# Patient Record
Sex: FEMALE | Race: WHITE | NOT HISPANIC OR LATINO | Employment: UNEMPLOYED | URBAN - METROPOLITAN AREA
[De-identification: names, ages, dates, MRNs, and addresses within clinical notes are randomized per-mention and may not be internally consistent; named-entity substitution may affect disease eponyms.]

---

## 2023-12-14 ENCOUNTER — HOSPITAL ENCOUNTER (EMERGENCY)
Facility: HOSPITAL | Age: 53
Discharge: HOME | End: 2023-12-14
Payer: COMMERCIAL

## 2023-12-14 VITALS
HEART RATE: 82 BPM | TEMPERATURE: 98.2 F | BODY MASS INDEX: 20.16 KG/M2 | SYSTOLIC BLOOD PRESSURE: 115 MMHG | WEIGHT: 133 LBS | RESPIRATION RATE: 18 BRPM | OXYGEN SATURATION: 99 % | DIASTOLIC BLOOD PRESSURE: 78 MMHG | HEIGHT: 68 IN

## 2023-12-14 DIAGNOSIS — N30.91 HEMORRHAGIC CYSTITIS: Primary | ICD-10-CM

## 2023-12-14 LAB
ANION GAP SERPL CALC-SCNC: 11 MMOL/L (ref 10–20)
APPEARANCE UR: ABNORMAL
BACTERIA #/AREA URNS AUTO: ABNORMAL /HPF
BASOPHILS # BLD AUTO: 0.03 X10*3/UL (ref 0–0.1)
BASOPHILS NFR BLD AUTO: 0.2 %
BILIRUB UR STRIP.AUTO-MCNC: NEGATIVE MG/DL
BUN SERPL-MCNC: 19 MG/DL (ref 6–23)
CALCIUM SERPL-MCNC: 9.9 MG/DL (ref 8.6–10.3)
CHLORIDE SERPL-SCNC: 102 MMOL/L (ref 98–107)
CO2 SERPL-SCNC: 27 MMOL/L (ref 21–32)
COLOR UR: YELLOW
CREAT SERPL-MCNC: 0.8 MG/DL (ref 0.5–1.05)
EOSINOPHIL # BLD AUTO: 0.04 X10*3/UL (ref 0–0.7)
EOSINOPHIL NFR BLD AUTO: 0.3 %
ERYTHROCYTE [DISTWIDTH] IN BLOOD BY AUTOMATED COUNT: 12.3 % (ref 11.5–14.5)
GFR SERPL CREATININE-BSD FRML MDRD: 89 ML/MIN/1.73M*2
GLUCOSE SERPL-MCNC: 93 MG/DL (ref 74–99)
GLUCOSE UR STRIP.AUTO-MCNC: NEGATIVE MG/DL
HCT VFR BLD AUTO: 42.4 % (ref 36–46)
HGB BLD-MCNC: 14.3 G/DL (ref 12–16)
HOLD SPECIMEN: NORMAL
IMM GRANULOCYTES # BLD AUTO: 0.06 X10*3/UL (ref 0–0.7)
IMM GRANULOCYTES NFR BLD AUTO: 0.4 % (ref 0–0.9)
KETONES UR STRIP.AUTO-MCNC: NEGATIVE MG/DL
LEUKOCYTE ESTERASE UR QL STRIP.AUTO: ABNORMAL
LYMPHOCYTES # BLD AUTO: 3.24 X10*3/UL (ref 1.2–4.8)
LYMPHOCYTES NFR BLD AUTO: 21.3 %
MCH RBC QN AUTO: 30.5 PG (ref 26–34)
MCHC RBC AUTO-ENTMCNC: 33.7 G/DL (ref 32–36)
MCV RBC AUTO: 90 FL (ref 80–100)
MONOCYTES # BLD AUTO: 0.83 X10*3/UL (ref 0.1–1)
MONOCYTES NFR BLD AUTO: 5.4 %
MUCOUS THREADS #/AREA URNS AUTO: ABNORMAL /LPF
NEUTROPHILS # BLD AUTO: 11.03 X10*3/UL (ref 1.2–7.7)
NEUTROPHILS NFR BLD AUTO: 72.4 %
NITRITE UR QL STRIP.AUTO: NEGATIVE
NRBC BLD-RTO: 0 /100 WBCS (ref 0–0)
PH UR STRIP.AUTO: 6 [PH]
PLATELET # BLD AUTO: 324 X10*3/UL (ref 150–450)
POTASSIUM SERPL-SCNC: 4 MMOL/L (ref 3.5–5.3)
PROT UR STRIP.AUTO-MCNC: ABNORMAL MG/DL
RBC # BLD AUTO: 4.69 X10*6/UL (ref 4–5.2)
RBC # UR STRIP.AUTO: ABNORMAL /UL
RBC #/AREA URNS AUTO: >20 /HPF
SODIUM SERPL-SCNC: 136 MMOL/L (ref 136–145)
SP GR UR STRIP.AUTO: 1.02
SQUAMOUS #/AREA URNS AUTO: ABNORMAL /HPF
UROBILINOGEN UR STRIP.AUTO-MCNC: <2 MG/DL
WBC # BLD AUTO: 15.2 X10*3/UL (ref 4.4–11.3)
WBC #/AREA URNS AUTO: >50 /HPF

## 2023-12-14 PROCEDURE — 96365 THER/PROPH/DIAG IV INF INIT: CPT

## 2023-12-14 PROCEDURE — 96361 HYDRATE IV INFUSION ADD-ON: CPT

## 2023-12-14 PROCEDURE — 80048 BASIC METABOLIC PNL TOTAL CA: CPT | Performed by: PHYSICIAN ASSISTANT

## 2023-12-14 PROCEDURE — 85025 COMPLETE CBC W/AUTO DIFF WBC: CPT | Performed by: PHYSICIAN ASSISTANT

## 2023-12-14 PROCEDURE — 36415 COLL VENOUS BLD VENIPUNCTURE: CPT | Performed by: PHYSICIAN ASSISTANT

## 2023-12-14 PROCEDURE — 2500000004 HC RX 250 GENERAL PHARMACY W/ HCPCS (ALT 636 FOR OP/ED): Performed by: PHYSICIAN ASSISTANT

## 2023-12-14 PROCEDURE — 99284 EMERGENCY DEPT VISIT MOD MDM: CPT

## 2023-12-14 PROCEDURE — 81001 URINALYSIS AUTO W/SCOPE: CPT | Performed by: PHYSICIAN ASSISTANT

## 2023-12-14 RX ORDER — FLUCONAZOLE 150 MG/1
150 TABLET ORAL ONCE
Qty: 2 TABLET | Refills: 0 | Status: SHIPPED | OUTPATIENT
Start: 2023-12-14 | End: 2023-12-14

## 2023-12-14 RX ORDER — CEPHALEXIN 500 MG/1
500 TABLET ORAL 4 TIMES DAILY
Qty: 40 TABLET | Refills: 0 | Status: SHIPPED | OUTPATIENT
Start: 2023-12-14 | End: 2023-12-24

## 2023-12-14 RX ORDER — CEFTRIAXONE 1 G/50ML
1 INJECTION, SOLUTION INTRAVENOUS ONCE
Status: COMPLETED | OUTPATIENT
Start: 2023-12-14 | End: 2023-12-14

## 2023-12-14 RX ADMIN — CEFTRIAXONE SODIUM 1 G: 1 INJECTION, SOLUTION INTRAVENOUS at 19:22

## 2023-12-14 RX ADMIN — SODIUM CHLORIDE 1000 ML: 9 INJECTION, SOLUTION INTRAVENOUS at 18:14

## 2023-12-14 ASSESSMENT — PAIN - FUNCTIONAL ASSESSMENT: PAIN_FUNCTIONAL_ASSESSMENT: 0-10

## 2023-12-14 ASSESSMENT — LIFESTYLE VARIABLES
HAVE YOU EVER FELT YOU SHOULD CUT DOWN ON YOUR DRINKING: NO
HAVE PEOPLE ANNOYED YOU BY CRITICIZING YOUR DRINKING: NO
EVER FELT BAD OR GUILTY ABOUT YOUR DRINKING: NO
EVER HAD A DRINK FIRST THING IN THE MORNING TO STEADY YOUR NERVES TO GET RID OF A HANGOVER: NO
REASON UNABLE TO ASSESS: NO

## 2023-12-14 ASSESSMENT — COLUMBIA-SUICIDE SEVERITY RATING SCALE - C-SSRS
1. IN THE PAST MONTH, HAVE YOU WISHED YOU WERE DEAD OR WISHED YOU COULD GO TO SLEEP AND NOT WAKE UP?: NO
6. HAVE YOU EVER DONE ANYTHING, STARTED TO DO ANYTHING, OR PREPARED TO DO ANYTHING TO END YOUR LIFE?: NO
2. HAVE YOU ACTUALLY HAD ANY THOUGHTS OF KILLING YOURSELF?: NO

## 2023-12-14 ASSESSMENT — PAIN SCALES - GENERAL: PAINLEVEL_OUTOF10: 0 - NO PAIN

## 2023-12-14 NOTE — Clinical Note
Kasey Byrne was seen and treated in our emergency department on 12/14/2023.  She may return to work on 12/18/2023.       If you have any questions or concerns, please don't hesitate to call.      Samreen Martinez PA-C

## 2023-12-14 NOTE — ED PROVIDER NOTES
"HPI   Chief Complaint   Patient presents with    Blood in Urine     Hx of hematuria and connective tissue disorder and neurogenic bladder        53-year-old female presents to the emergency department for complaints of bloody urine.  Patient states \"I have a connective tissue disorder so I have chronic gross hematuria.\"  She states sometimes she has a urinary tract infection associated but not always.  States that she has had some pressure with urinating and seems to be bleeding more this week so she wanted to get checked out.  She has never required a blood transfusion for urinary blood loss.  She does not see a urologist or nephrologist.  She denies abdominal pain, flank pain, back pain, nausea, vomiting, fevers, flulike symptoms.  She has no history of kidney stones.                          No data recorded                Patient History   Past Medical History:   Diagnosis Date    Personal history of other diseases of the female genital tract     History of ovarian cyst    Personal history of other diseases of the musculoskeletal system and connective tissue     History of joint pain    Sleep deprivation     Sleep deficient     Past Surgical History:   Procedure Laterality Date    OTHER SURGICAL HISTORY  08/14/2020    Jaw surgery    OTHER SURGICAL HISTORY  08/14/2020    Dilation and curettage    OTHER SURGICAL HISTORY  08/14/2020    Nasal septoplasty    OTHER SURGICAL HISTORY  08/14/2020    Gallbladder surgery    OTHER SURGICAL HISTORY  08/14/2020    Varicose vein ligation     No family history on file.  Social History     Tobacco Use    Smoking status: Not on file    Smokeless tobacco: Not on file   Substance Use Topics    Alcohol use: Not on file    Drug use: Not on file       Physical Exam   ED Triage Vitals [12/14/23 1651]   Temp Heart Rate Resp BP   36.8 °C (98.2 °F) 82 18 115/78      SpO2 Temp src Heart Rate Source Patient Position   99 % -- -- --      BP Location FiO2 (%)     -- --       Physical " Exam  Vitals and nursing note reviewed.   Constitutional:       General: She is not in acute distress.  HENT:      Head: Atraumatic.      Mouth/Throat:      Mouth: Mucous membranes are moist.      Pharynx: Oropharynx is clear.   Eyes:      Extraocular Movements: Extraocular movements intact.      Conjunctiva/sclera: Conjunctivae normal.      Pupils: Pupils are equal, round, and reactive to light.   Cardiovascular:      Rate and Rhythm: Normal rate and regular rhythm.      Pulses: Normal pulses.   Pulmonary:      Effort: Pulmonary effort is normal. No respiratory distress.      Breath sounds: Normal breath sounds.   Abdominal:      General: There is no distension.      Palpations: Abdomen is soft.      Tenderness: There is no abdominal tenderness. There is no guarding or rebound.   Musculoskeletal:         General: No deformity.      Cervical back: Neck supple.   Skin:     General: Skin is warm and dry.   Neurological:      Mental Status: She is alert and oriented to person, place, and time. Mental status is at baseline.      Cranial Nerves: No cranial nerve deficit.      Sensory: No sensory deficit.      Motor: No weakness.   Psychiatric:         Mood and Affect: Mood normal.         Behavior: Behavior normal.         ED Course & MDM   Diagnoses as of 12/14/23 1936   Hemorrhagic cystitis       Medical Decision Making  53-year-old female presents the emergency department for blood in her urine and pressure when she urinates.  She has chronic hematuria.  On my exam, she does not appear to be in acute distress.  Heart and lungs are clear.  Abdomen is soft and nontender.  No CVA tenderness or flank tenderness.  Labs show a 15,000 white count with a left shift of 11.  No anemia, metabolic disturbance, renal insufficiency, electrolyte abnormality.  Urinalysis shows large blood, large leukocyte esterase, over 50 white cells, over 20 red cells and 1+ bacteria.    Discussed the results with the patient.  I ordered IV  Rocephin.  I will prescribe her Keflex and she requests Diflucan.  Patient referred to urology.  Discussed results with patient and/or family/friend and recommended close follow up with primary care or specialist.  Reviewed return precautions at length.  I answered all questions.           Procedure  Procedures     Samreen Martinez PA-C  12/14/23 1937

## 2023-12-14 NOTE — ED TRIAGE NOTES
Pt to ED for c/o blood in urine.  Hx of hematuria and connective tissue disorder and neurogenic bladder.  Respirations even and unlabored.  No acute distress noted at this time.  Denies CP and SOB.  A&Ox4.  VSS.

## 2023-12-27 DIAGNOSIS — R31.9 HEMATURIA, UNSPECIFIED TYPE: ICD-10-CM

## 2023-12-29 ENCOUNTER — HOSPITAL ENCOUNTER (OUTPATIENT)
Dept: RADIOLOGY | Facility: HOSPITAL | Age: 53
Discharge: HOME | End: 2023-12-29
Payer: COMMERCIAL

## 2023-12-29 DIAGNOSIS — R31.9 HEMATURIA, UNSPECIFIED TYPE: ICD-10-CM

## 2023-12-29 PROCEDURE — 76770 US EXAM ABDO BACK WALL COMP: CPT | Performed by: RADIOLOGY

## 2023-12-29 PROCEDURE — 76770 US EXAM ABDO BACK WALL COMP: CPT

## 2024-02-22 ENCOUNTER — LAB (OUTPATIENT)
Dept: LAB | Facility: LAB | Age: 54
End: 2024-02-22
Payer: COMMERCIAL

## 2024-02-22 DIAGNOSIS — R31.9 HEMATURIA, UNSPECIFIED TYPE: ICD-10-CM

## 2024-02-22 LAB
APPEARANCE UR: CLEAR
BILIRUB UR STRIP.AUTO-MCNC: NEGATIVE MG/DL
COLOR UR: YELLOW
GLUCOSE UR STRIP.AUTO-MCNC: NEGATIVE MG/DL
KETONES UR STRIP.AUTO-MCNC: NEGATIVE MG/DL
LEUKOCYTE ESTERASE UR QL STRIP.AUTO: NEGATIVE
NITRITE UR QL STRIP.AUTO: NEGATIVE
PH UR STRIP.AUTO: 8 [PH]
PROT UR STRIP.AUTO-MCNC: NEGATIVE MG/DL
RBC # UR STRIP.AUTO: NEGATIVE /UL
SP GR UR STRIP.AUTO: 1.01
UROBILINOGEN UR STRIP.AUTO-MCNC: <2 MG/DL

## 2024-02-22 PROCEDURE — 87086 URINE CULTURE/COLONY COUNT: CPT

## 2024-02-22 PROCEDURE — 88112 CYTOPATH CELL ENHANCE TECH: CPT

## 2024-02-22 PROCEDURE — 81003 URINALYSIS AUTO W/O SCOPE: CPT

## 2024-02-22 PROCEDURE — 88112 CYTOPATH CELL ENHANCE TECH: CPT | Performed by: PATHOLOGY

## 2024-02-23 ENCOUNTER — APPOINTMENT (OUTPATIENT)
Dept: UROLOGY | Facility: CLINIC | Age: 54
End: 2024-02-23
Payer: COMMERCIAL

## 2024-02-23 LAB
BACTERIA UR CULT: NORMAL
LABORATORY COMMENT REPORT: NORMAL
LABORATORY COMMENT REPORT: NORMAL
PATH REPORT.FINAL DX SPEC: NORMAL
PATH REPORT.GROSS SPEC: NORMAL
PATH REPORT.RELEVANT HX SPEC: NORMAL
PATH REPORT.TOTAL CANCER: NORMAL

## 2025-07-23 ENCOUNTER — APPOINTMENT (OUTPATIENT)
Dept: RADIOLOGY | Facility: HOSPITAL | Age: 55
End: 2025-07-23
Payer: MEDICARE

## 2025-07-23 ENCOUNTER — APPOINTMENT (OUTPATIENT)
Dept: CARDIOLOGY | Facility: HOSPITAL | Age: 55
End: 2025-07-23
Payer: MEDICARE

## 2025-07-23 ENCOUNTER — HOSPITAL ENCOUNTER (INPATIENT)
Facility: HOSPITAL | Age: 55
LOS: 1 days | Discharge: HOME | End: 2025-07-25
Attending: STUDENT IN AN ORGANIZED HEALTH CARE EDUCATION/TRAINING PROGRAM | Admitting: INTERNAL MEDICINE
Payer: MEDICARE

## 2025-07-23 DIAGNOSIS — Z86.73 STROKE, RECENT, WITHOUT LATE EFFECT: ICD-10-CM

## 2025-07-23 DIAGNOSIS — E04.2 MULTIPLE THYROID NODULES: ICD-10-CM

## 2025-07-23 DIAGNOSIS — R29.90 STROKE-LIKE SYMPTOMS: Primary | ICD-10-CM

## 2025-07-23 DIAGNOSIS — I63.9 CEREBRAL INFARCTION, UNSPECIFIED: ICD-10-CM

## 2025-07-23 PROBLEM — R49.0 DYSPHONIA: Status: ACTIVE | Noted: 2025-07-23

## 2025-07-23 PROBLEM — N93.9 ABNORMAL UTERINE BLEEDING (AUB): Status: ACTIVE | Noted: 2025-07-23

## 2025-07-23 PROBLEM — G47.33 OSA (OBSTRUCTIVE SLEEP APNEA): Status: ACTIVE | Noted: 2017-12-15

## 2025-07-23 PROBLEM — K58.9 IBS (IRRITABLE BOWEL SYNDROME): Status: ACTIVE | Noted: 2025-07-23

## 2025-07-23 PROBLEM — Z86.718 HISTORY OF DVT (DEEP VEIN THROMBOSIS): Status: ACTIVE | Noted: 2024-10-01

## 2025-07-23 PROBLEM — I95.1 ORTHOSTATIC HYPOTENSION: Status: ACTIVE | Noted: 2022-06-27

## 2025-07-23 PROBLEM — I73.00 RAYNAUD PHENOMENON: Status: ACTIVE | Noted: 2025-07-23

## 2025-07-23 PROBLEM — M24.9 HYPERMOBILITY OF JOINT: Status: ACTIVE | Noted: 2023-11-10

## 2025-07-23 PROBLEM — K21.00 EROSIVE GASTROESOPHAGEAL REFLUX DISEASE: Status: ACTIVE | Noted: 2025-07-23

## 2025-07-23 PROBLEM — F41.9 ANXIETY AND DEPRESSION: Status: ACTIVE | Noted: 2024-10-01

## 2025-07-23 PROBLEM — I45.81 LONG Q-T SYNDROME: Status: ACTIVE | Noted: 2025-07-23

## 2025-07-23 PROBLEM — R55 VASOVAGAL SYNCOPE: Status: ACTIVE | Noted: 2021-08-25

## 2025-07-23 PROBLEM — I31.39 PERICARDIAL EFFUSION (NONINFLAMMATORY) (HHS-HCC): Status: ACTIVE | Noted: 2021-08-25

## 2025-07-23 PROBLEM — F32.A ANXIETY AND DEPRESSION: Status: ACTIVE | Noted: 2024-10-01

## 2025-07-23 LAB
ALBUMIN SERPL BCP-MCNC: 4.3 G/DL (ref 3.4–5)
ALP SERPL-CCNC: 38 U/L (ref 33–110)
ALT SERPL W P-5'-P-CCNC: 8 U/L (ref 7–45)
ANION GAP SERPL CALC-SCNC: 10 MMOL/L (ref 10–20)
APTT PPP: 25 SECONDS (ref 26–36)
AST SERPL W P-5'-P-CCNC: 13 U/L (ref 9–39)
BASOPHILS # BLD AUTO: 0.02 X10*3/UL (ref 0–0.1)
BASOPHILS NFR BLD AUTO: 0.3 %
BILIRUB SERPL-MCNC: 0.4 MG/DL (ref 0–1.2)
BUN SERPL-MCNC: 15 MG/DL (ref 6–23)
CALCIUM SERPL-MCNC: 9.1 MG/DL (ref 8.6–10.3)
CARDIAC TROPONIN I PNL SERPL HS: <3 NG/L (ref 0–13)
CHLORIDE SERPL-SCNC: 105 MMOL/L (ref 98–107)
CO2 SERPL-SCNC: 26 MMOL/L (ref 21–32)
CREAT SERPL-MCNC: 1.06 MG/DL (ref 0.5–1.05)
EGFRCR SERPLBLD CKD-EPI 2021: 63 ML/MIN/1.73M*2
EOSINOPHIL # BLD AUTO: 0.09 X10*3/UL (ref 0–0.7)
EOSINOPHIL NFR BLD AUTO: 1.1 %
ERYTHROCYTE [DISTWIDTH] IN BLOOD BY AUTOMATED COUNT: 12.6 % (ref 11.5–14.5)
GLUCOSE SERPL-MCNC: 92 MG/DL (ref 74–99)
HCT VFR BLD AUTO: 39.3 % (ref 36–46)
HGB BLD-MCNC: 13 G/DL (ref 12–16)
IMM GRANULOCYTES # BLD AUTO: 0.02 X10*3/UL (ref 0–0.7)
IMM GRANULOCYTES NFR BLD AUTO: 0.3 % (ref 0–0.9)
INR PPP: 1.1 (ref 0.9–1.1)
LYMPHOCYTES # BLD AUTO: 3.7 X10*3/UL (ref 1.2–4.8)
LYMPHOCYTES NFR BLD AUTO: 46.4 %
MCH RBC QN AUTO: 30.2 PG (ref 26–34)
MCHC RBC AUTO-ENTMCNC: 33.1 G/DL (ref 32–36)
MCV RBC AUTO: 91 FL (ref 80–100)
MONOCYTES # BLD AUTO: 0.43 X10*3/UL (ref 0.1–1)
MONOCYTES NFR BLD AUTO: 5.4 %
NEUTROPHILS # BLD AUTO: 3.72 X10*3/UL (ref 1.2–7.7)
NEUTROPHILS NFR BLD AUTO: 46.5 %
NRBC BLD-RTO: 0 /100 WBCS (ref 0–0)
PLATELET # BLD AUTO: 280 X10*3/UL (ref 150–450)
POCT GLUCOSE: 90 MG/DL (ref 74–99)
POTASSIUM SERPL-SCNC: 3.5 MMOL/L (ref 3.5–5.3)
PROT SERPL-MCNC: 6.8 G/DL (ref 6.4–8.2)
PROTHROMBIN TIME: 11.8 SECONDS (ref 9.8–12.4)
RBC # BLD AUTO: 4.31 X10*6/UL (ref 4–5.2)
SODIUM SERPL-SCNC: 137 MMOL/L (ref 136–145)
WBC # BLD AUTO: 8 X10*3/UL (ref 4.4–11.3)

## 2025-07-23 PROCEDURE — 85025 COMPLETE CBC W/AUTO DIFF WBC: CPT | Performed by: STUDENT IN AN ORGANIZED HEALTH CARE EDUCATION/TRAINING PROGRAM

## 2025-07-23 PROCEDURE — 85730 THROMBOPLASTIN TIME PARTIAL: CPT | Performed by: STUDENT IN AN ORGANIZED HEALTH CARE EDUCATION/TRAINING PROGRAM

## 2025-07-23 PROCEDURE — 80053 COMPREHEN METABOLIC PANEL: CPT | Performed by: STUDENT IN AN ORGANIZED HEALTH CARE EDUCATION/TRAINING PROGRAM

## 2025-07-23 PROCEDURE — 70498 CT ANGIOGRAPHY NECK: CPT | Performed by: STUDENT IN AN ORGANIZED HEALTH CARE EDUCATION/TRAINING PROGRAM

## 2025-07-23 PROCEDURE — 2550000001 HC RX 255 CONTRASTS: Performed by: STUDENT IN AN ORGANIZED HEALTH CARE EDUCATION/TRAINING PROGRAM

## 2025-07-23 PROCEDURE — 70450 CT HEAD/BRAIN W/O DYE: CPT

## 2025-07-23 PROCEDURE — 85610 PROTHROMBIN TIME: CPT | Performed by: STUDENT IN AN ORGANIZED HEALTH CARE EDUCATION/TRAINING PROGRAM

## 2025-07-23 PROCEDURE — 70450 CT HEAD/BRAIN W/O DYE: CPT | Performed by: RADIOLOGY

## 2025-07-23 PROCEDURE — 84484 ASSAY OF TROPONIN QUANT: CPT | Performed by: STUDENT IN AN ORGANIZED HEALTH CARE EDUCATION/TRAINING PROGRAM

## 2025-07-23 PROCEDURE — 93005 ELECTROCARDIOGRAM TRACING: CPT

## 2025-07-23 PROCEDURE — 36415 COLL VENOUS BLD VENIPUNCTURE: CPT | Performed by: STUDENT IN AN ORGANIZED HEALTH CARE EDUCATION/TRAINING PROGRAM

## 2025-07-23 PROCEDURE — 70498 CT ANGIOGRAPHY NECK: CPT

## 2025-07-23 PROCEDURE — 99285 EMERGENCY DEPT VISIT HI MDM: CPT | Mod: 25 | Performed by: STUDENT IN AN ORGANIZED HEALTH CARE EDUCATION/TRAINING PROGRAM

## 2025-07-23 PROCEDURE — 70496 CT ANGIOGRAPHY HEAD: CPT | Performed by: STUDENT IN AN ORGANIZED HEALTH CARE EDUCATION/TRAINING PROGRAM

## 2025-07-23 RX ADMIN — IOHEXOL 50 ML: 350 INJECTION, SOLUTION INTRAVENOUS at 22:46

## 2025-07-23 ASSESSMENT — LIFESTYLE VARIABLES
EVER HAD A DRINK FIRST THING IN THE MORNING TO STEADY YOUR NERVES TO GET RID OF A HANGOVER: NO
HAVE YOU EVER FELT YOU SHOULD CUT DOWN ON YOUR DRINKING: NO
EVER FELT BAD OR GUILTY ABOUT YOUR DRINKING: NO
HAVE PEOPLE ANNOYED YOU BY CRITICIZING YOUR DRINKING: NO
TOTAL SCORE: 0

## 2025-07-23 ASSESSMENT — PAIN DESCRIPTION - LOCATION: LOCATION: OTHER (COMMENT)

## 2025-07-23 ASSESSMENT — PAIN DESCRIPTION - FREQUENCY: FREQUENCY: CONSTANT/CONTINUOUS

## 2025-07-23 ASSESSMENT — PAIN SCALES - GENERAL
PAINLEVEL_OUTOF10: 5 - MODERATE PAIN
PAINLEVEL_OUTOF10: 5 - MODERATE PAIN

## 2025-07-23 ASSESSMENT — PAIN DESCRIPTION - ONSET: ONSET: ONGOING

## 2025-07-23 ASSESSMENT — PAIN DESCRIPTION - PROGRESSION: CLINICAL_PROGRESSION: NOT CHANGED

## 2025-07-23 ASSESSMENT — PAIN DESCRIPTION - DESCRIPTORS: DESCRIPTORS: ACHING

## 2025-07-23 ASSESSMENT — PAIN - FUNCTIONAL ASSESSMENT: PAIN_FUNCTIONAL_ASSESSMENT: 0-10

## 2025-07-23 ASSESSMENT — PAIN DESCRIPTION - PAIN TYPE: TYPE: CHRONIC PAIN

## 2025-07-24 ENCOUNTER — APPOINTMENT (OUTPATIENT)
Dept: CARDIOLOGY | Facility: HOSPITAL | Age: 55
End: 2025-07-24
Payer: MEDICARE

## 2025-07-24 ENCOUNTER — APPOINTMENT (OUTPATIENT)
Dept: RADIOLOGY | Facility: HOSPITAL | Age: 55
End: 2025-07-24
Payer: MEDICARE

## 2025-07-24 PROBLEM — R41.89 SPELL OF ALTERED COGNITION: Status: ACTIVE | Noted: 2025-07-24

## 2025-07-24 PROBLEM — R47.81 SLURRED SPEECH: Status: ACTIVE | Noted: 2025-07-24

## 2025-07-24 LAB
ALBUMIN SERPL BCP-MCNC: 3.6 G/DL (ref 3.4–5)
ALP SERPL-CCNC: 30 U/L (ref 33–110)
ALT SERPL W P-5'-P-CCNC: 7 U/L (ref 7–45)
AMPHETAMINES UR QL SCN: ABNORMAL
ANION GAP SERPL CALC-SCNC: 9 MMOL/L (ref 10–20)
AORTIC VALVE MEAN GRADIENT: 3 MMHG
AORTIC VALVE PEAK VELOCITY: 1.19 M/S
APPEARANCE UR: CLEAR
AST SERPL W P-5'-P-CCNC: 10 U/L (ref 9–39)
ATRIAL RATE: 82 BPM
AV PEAK GRADIENT: 6 MMHG
AVA (PEAK VEL): 3.09 CM2
AVA (VTI): 3.19 CM2
BARBITURATES UR QL SCN: ABNORMAL
BASOPHILS # BLD AUTO: 0.01 X10*3/UL (ref 0–0.1)
BASOPHILS NFR BLD AUTO: 0.1 %
BENZODIAZ UR QL SCN: ABNORMAL
BILIRUB SERPL-MCNC: 0.3 MG/DL (ref 0–1.2)
BILIRUB UR STRIP.AUTO-MCNC: NEGATIVE MG/DL
BNP SERPL-MCNC: 15 PG/ML (ref 0–99)
BUN SERPL-MCNC: 15 MG/DL (ref 6–23)
BZE UR QL SCN: ABNORMAL
CALCIUM SERPL-MCNC: 8.3 MG/DL (ref 8.6–10.3)
CANNABINOIDS UR QL SCN: ABNORMAL
CHLORIDE SERPL-SCNC: 109 MMOL/L (ref 98–107)
CHOLEST SERPL-MCNC: 122 MG/DL (ref 0–199)
CHOLESTEROL/HDL RATIO: 2.2
CO2 SERPL-SCNC: 26 MMOL/L (ref 21–32)
COLOR UR: COLORLESS
CREAT SERPL-MCNC: 1.07 MG/DL (ref 0.5–1.05)
EGFRCR SERPLBLD CKD-EPI 2021: 63 ML/MIN/1.73M*2
EJECTION FRACTION APICAL 4 CHAMBER: 71
EJECTION FRACTION: 68 %
EOSINOPHIL # BLD AUTO: 0.09 X10*3/UL (ref 0–0.7)
EOSINOPHIL NFR BLD AUTO: 1.3 %
ERYTHROCYTE [DISTWIDTH] IN BLOOD BY AUTOMATED COUNT: 12.8 % (ref 11.5–14.5)
EST. AVERAGE GLUCOSE BLD GHB EST-MCNC: 94 MG/DL
FENTANYL+NORFENTANYL UR QL SCN: ABNORMAL
GLUCOSE BLD MANUAL STRIP-MCNC: 105 MG/DL (ref 74–99)
GLUCOSE BLD MANUAL STRIP-MCNC: 95 MG/DL (ref 74–99)
GLUCOSE BLD MANUAL STRIP-MCNC: 97 MG/DL (ref 74–99)
GLUCOSE BLD MANUAL STRIP-MCNC: 99 MG/DL (ref 74–99)
GLUCOSE SERPL-MCNC: 97 MG/DL (ref 74–99)
GLUCOSE UR STRIP.AUTO-MCNC: NORMAL MG/DL
HBA1C MFR BLD: 4.9 % (ref ?–5.7)
HCT VFR BLD AUTO: 33.3 % (ref 36–46)
HDLC SERPL-MCNC: 55.2 MG/DL
HGB BLD-MCNC: 11.3 G/DL (ref 12–16)
HOLD SPECIMEN: NORMAL
IMM GRANULOCYTES # BLD AUTO: 0.01 X10*3/UL (ref 0–0.7)
IMM GRANULOCYTES NFR BLD AUTO: 0.1 % (ref 0–0.9)
KETONES UR STRIP.AUTO-MCNC: NEGATIVE MG/DL
LDLC SERPL CALC-MCNC: 57 MG/DL
LEFT ATRIUM VOLUME AREA LENGTH INDEX BSA: 20.9 ML/M2
LEFT VENTRICLE INTERNAL DIMENSION DIASTOLE: 4.5 CM (ref 3.5–6)
LEFT VENTRICULAR OUTFLOW TRACT DIAMETER: 2.1 CM
LEUKOCYTE ESTERASE UR QL STRIP.AUTO: NEGATIVE
LV EJECTION FRACTION BIPLANE: 69 %
LYMPHOCYTES # BLD AUTO: 3.48 X10*3/UL (ref 1.2–4.8)
LYMPHOCYTES NFR BLD AUTO: 50.5 %
MAGNESIUM SERPL-MCNC: 1.86 MG/DL (ref 1.6–2.4)
MCH RBC QN AUTO: 30.4 PG (ref 26–34)
MCHC RBC AUTO-ENTMCNC: 33.9 G/DL (ref 32–36)
MCV RBC AUTO: 90 FL (ref 80–100)
METHADONE UR QL SCN: ABNORMAL
MITRAL VALVE E/A RATIO: 1.35
MONOCYTES # BLD AUTO: 0.39 X10*3/UL (ref 0.1–1)
MONOCYTES NFR BLD AUTO: 5.7 %
NEUTROPHILS # BLD AUTO: 2.91 X10*3/UL (ref 1.2–7.7)
NEUTROPHILS NFR BLD AUTO: 42.3 %
NITRITE UR QL STRIP.AUTO: NEGATIVE
NON HDL CHOLESTEROL: 67 MG/DL (ref 0–149)
NRBC BLD-RTO: 0 /100 WBCS (ref 0–0)
OPIATES UR QL SCN: ABNORMAL
OXYCODONE+OXYMORPHONE UR QL SCN: ABNORMAL
P AXIS: 150 DEGREES
P OFFSET: 194 MS
P ONSET: 145 MS
PCP UR QL SCN: ABNORMAL
PH UR STRIP.AUTO: 7.5 [PH]
PLATELET # BLD AUTO: 261 X10*3/UL (ref 150–450)
POTASSIUM SERPL-SCNC: 3.6 MMOL/L (ref 3.5–5.3)
PR INTERVAL: 146 MS
PROT SERPL-MCNC: 5.3 G/DL (ref 6.4–8.2)
PROT UR STRIP.AUTO-MCNC: NEGATIVE MG/DL
Q ONSET: 218 MS
QRS COUNT: 13 BEATS
QRS DURATION: 88 MS
QT INTERVAL: 424 MS
QTC CALCULATION(BAZETT): 495 MS
QTC FREDERICIA: 470 MS
R AXIS: 26 DEGREES
RBC # BLD AUTO: 3.72 X10*6/UL (ref 4–5.2)
RBC # UR STRIP.AUTO: NEGATIVE MG/DL
RIGHT VENTRICLE FREE WALL PEAK S': 13.4 CM/S
RIGHT VENTRICLE PEAK SYSTOLIC PRESSURE: 26 MMHG
SODIUM SERPL-SCNC: 140 MMOL/L (ref 136–145)
SP GR UR STRIP.AUTO: 1.01
T AXIS: 116 DEGREES
T OFFSET: 430 MS
TRICUSPID ANNULAR PLANE SYSTOLIC EXCURSION: 3.5 CM
TRIGL SERPL-MCNC: 50 MG/DL (ref 0–149)
TSH SERPL-ACNC: 1.73 MIU/L (ref 0.44–3.98)
UROBILINOGEN UR STRIP.AUTO-MCNC: NORMAL MG/DL
VENTRICULAR RATE: 82 BPM
VLDL: 10 MG/DL (ref 0–40)
WBC # BLD AUTO: 6.9 X10*3/UL (ref 4.4–11.3)

## 2025-07-24 PROCEDURE — 97161 PT EVAL LOW COMPLEX 20 MIN: CPT | Mod: GP

## 2025-07-24 PROCEDURE — 97165 OT EVAL LOW COMPLEX 30 MIN: CPT | Mod: GO

## 2025-07-24 PROCEDURE — 82607 VITAMIN B-12: CPT | Mod: ELYLAB | Performed by: STUDENT IN AN ORGANIZED HEALTH CARE EDUCATION/TRAINING PROGRAM

## 2025-07-24 PROCEDURE — 84443 ASSAY THYROID STIM HORMONE: CPT

## 2025-07-24 PROCEDURE — 93306 TTE W/DOPPLER COMPLETE: CPT

## 2025-07-24 PROCEDURE — 83036 HEMOGLOBIN GLYCOSYLATED A1C: CPT | Mod: ELYLAB | Performed by: NURSE PRACTITIONER

## 2025-07-24 PROCEDURE — 82947 ASSAY GLUCOSE BLOOD QUANT: CPT

## 2025-07-24 PROCEDURE — 80061 LIPID PANEL: CPT | Performed by: NURSE PRACTITIONER

## 2025-07-24 PROCEDURE — 2500000004 HC RX 250 GENERAL PHARMACY W/ HCPCS (ALT 636 FOR OP/ED): Performed by: STUDENT IN AN ORGANIZED HEALTH CARE EDUCATION/TRAINING PROGRAM

## 2025-07-24 PROCEDURE — 80053 COMPREHEN METABOLIC PANEL: CPT | Performed by: NURSE PRACTITIONER

## 2025-07-24 PROCEDURE — 83921 ORGANIC ACID SINGLE QUANT: CPT | Performed by: STUDENT IN AN ORGANIZED HEALTH CARE EDUCATION/TRAINING PROGRAM

## 2025-07-24 PROCEDURE — 99223 1ST HOSP IP/OBS HIGH 75: CPT | Performed by: NURSE PRACTITIONER

## 2025-07-24 PROCEDURE — 36415 COLL VENOUS BLD VENIPUNCTURE: CPT | Performed by: NURSE PRACTITIONER

## 2025-07-24 PROCEDURE — 70551 MRI BRAIN STEM W/O DYE: CPT

## 2025-07-24 PROCEDURE — 70551 MRI BRAIN STEM W/O DYE: CPT | Performed by: RADIOLOGY

## 2025-07-24 PROCEDURE — 83735 ASSAY OF MAGNESIUM: CPT | Performed by: NURSE PRACTITIONER

## 2025-07-24 PROCEDURE — 83880 ASSAY OF NATRIURETIC PEPTIDE: CPT | Performed by: NURSE PRACTITIONER

## 2025-07-24 PROCEDURE — 99222 1ST HOSP IP/OBS MODERATE 55: CPT | Performed by: STUDENT IN AN ORGANIZED HEALTH CARE EDUCATION/TRAINING PROGRAM

## 2025-07-24 PROCEDURE — 80307 DRUG TEST PRSMV CHEM ANLYZR: CPT | Performed by: NURSE PRACTITIONER

## 2025-07-24 PROCEDURE — 1200000002 HC GENERAL ROOM WITH TELEMETRY DAILY

## 2025-07-24 PROCEDURE — 2500000001 HC RX 250 WO HCPCS SELF ADMINISTERED DRUGS (ALT 637 FOR MEDICARE OP): Performed by: NURSE PRACTITIONER

## 2025-07-24 PROCEDURE — 81003 URINALYSIS AUTO W/O SCOPE: CPT | Performed by: NURSE PRACTITIONER

## 2025-07-24 PROCEDURE — 93306 TTE W/DOPPLER COMPLETE: CPT | Performed by: INTERNAL MEDICINE

## 2025-07-24 PROCEDURE — 85025 COMPLETE CBC W/AUTO DIFF WBC: CPT | Performed by: NURSE PRACTITIONER

## 2025-07-24 RX ORDER — PROGESTERONE 200 MG/1
200 CAPSULE ORAL NIGHTLY
COMMUNITY

## 2025-07-24 RX ORDER — ACETAMINOPHEN 160 MG/5ML
650 SOLUTION ORAL EVERY 4 HOURS PRN
Status: DISCONTINUED | OUTPATIENT
Start: 2025-07-24 | End: 2025-07-25 | Stop reason: HOSPADM

## 2025-07-24 RX ORDER — CYCLOBENZAPRINE HCL 10 MG
10 TABLET ORAL AS NEEDED
COMMUNITY

## 2025-07-24 RX ORDER — CROMOLYN SODIUM 5.2 MG/ML
1 SPRAY, METERED NASAL 2 TIMES DAILY
COMMUNITY

## 2025-07-24 RX ORDER — LACTASE 3000 UNIT
3000 TABLET ORAL 3 TIMES DAILY PRN
COMMUNITY

## 2025-07-24 RX ORDER — ATORVASTATIN CALCIUM 20 MG/1
40 TABLET, FILM COATED ORAL NIGHTLY
Status: DISCONTINUED | OUTPATIENT
Start: 2025-07-24 | End: 2025-07-25 | Stop reason: HOSPADM

## 2025-07-24 RX ORDER — ONDANSETRON 4 MG/1
4 TABLET, FILM COATED ORAL EVERY 8 HOURS PRN
Status: DISCONTINUED | OUTPATIENT
Start: 2025-07-24 | End: 2025-07-25 | Stop reason: HOSPADM

## 2025-07-24 RX ORDER — CROMOLYN SODIUM 40 MG/ML
1 SOLUTION/ DROPS OPHTHALMIC 4 TIMES DAILY
COMMUNITY

## 2025-07-24 RX ORDER — DOCUSATE SODIUM 100 MG/1
100 CAPSULE, LIQUID FILLED ORAL 2 TIMES DAILY
Status: DISCONTINUED | OUTPATIENT
Start: 2025-07-24 | End: 2025-07-25 | Stop reason: HOSPADM

## 2025-07-24 RX ORDER — NAPROXEN SODIUM 220 MG/1
81 TABLET, FILM COATED ORAL DAILY
Status: DISCONTINUED | OUTPATIENT
Start: 2025-07-24 | End: 2025-07-25 | Stop reason: HOSPADM

## 2025-07-24 RX ORDER — ACETAMINOPHEN 650 MG/1
650 SUPPOSITORY RECTAL EVERY 4 HOURS PRN
Status: DISCONTINUED | OUTPATIENT
Start: 2025-07-24 | End: 2025-07-25 | Stop reason: HOSPADM

## 2025-07-24 RX ORDER — METHOCARBAMOL 750 MG/1
750 TABLET, FILM COATED ORAL 3 TIMES DAILY PRN
COMMUNITY

## 2025-07-24 RX ORDER — ASPIRIN 300 MG/1
300 SUPPOSITORY RECTAL DAILY
Status: DISCONTINUED | OUTPATIENT
Start: 2025-07-24 | End: 2025-07-25 | Stop reason: HOSPADM

## 2025-07-24 RX ORDER — DEXTROSE 50 % IN WATER (D50W) INTRAVENOUS SYRINGE
12.5
Status: DISCONTINUED | OUTPATIENT
Start: 2025-07-24 | End: 2025-07-25 | Stop reason: HOSPADM

## 2025-07-24 RX ORDER — LABETALOL HYDROCHLORIDE 5 MG/ML
10 INJECTION, SOLUTION INTRAVENOUS EVERY 10 MIN PRN
Status: DISCONTINUED | OUTPATIENT
Start: 2025-07-24 | End: 2025-07-25 | Stop reason: HOSPADM

## 2025-07-24 RX ORDER — ESTERIFIED ESTROGENS AND METHYLTESTOSTERONE .625; 1.25 MG/1; MG/1
1 TABLET, FILM COATED ORAL DAILY
COMMUNITY

## 2025-07-24 RX ORDER — AZELASTINE 1 MG/ML
1 SPRAY, METERED NASAL 2 TIMES DAILY
COMMUNITY

## 2025-07-24 RX ORDER — ASPIRIN 81 MG/1
81 TABLET ORAL DAILY
Status: DISCONTINUED | OUTPATIENT
Start: 2025-07-24 | End: 2025-07-25 | Stop reason: HOSPADM

## 2025-07-24 RX ORDER — ACETAMINOPHEN 325 MG/1
650 TABLET ORAL EVERY 4 HOURS PRN
Status: DISCONTINUED | OUTPATIENT
Start: 2025-07-24 | End: 2025-07-25 | Stop reason: HOSPADM

## 2025-07-24 RX ORDER — ONDANSETRON HYDROCHLORIDE 2 MG/ML
4 INJECTION, SOLUTION INTRAVENOUS EVERY 8 HOURS PRN
Status: DISCONTINUED | OUTPATIENT
Start: 2025-07-24 | End: 2025-07-25 | Stop reason: HOSPADM

## 2025-07-24 RX ORDER — DEXTROSE 50 % IN WATER (D50W) INTRAVENOUS SYRINGE
25
Status: DISCONTINUED | OUTPATIENT
Start: 2025-07-24 | End: 2025-07-25 | Stop reason: HOSPADM

## 2025-07-24 RX ORDER — INSULIN LISPRO 100 [IU]/ML
0-5 INJECTION, SOLUTION INTRAVENOUS; SUBCUTANEOUS
Status: DISCONTINUED | OUTPATIENT
Start: 2025-07-24 | End: 2025-07-25 | Stop reason: HOSPADM

## 2025-07-24 RX ORDER — MELOXICAM 15 MG/1
15 TABLET ORAL DAILY
COMMUNITY

## 2025-07-24 RX ADMIN — ATORVASTATIN CALCIUM 40 MG: 20 TABLET, FILM COATED ORAL at 06:02

## 2025-07-24 RX ADMIN — SODIUM CHLORIDE 1000 ML: 0.9 INJECTION, SOLUTION INTRAVENOUS at 00:08

## 2025-07-24 RX ADMIN — DOCUSATE SODIUM 100 MG: 100 CAPSULE, LIQUID FILLED ORAL at 21:51

## 2025-07-24 RX ADMIN — ASPIRIN 81 MG: 81 TABLET, CHEWABLE ORAL at 09:13

## 2025-07-24 RX ADMIN — DOCUSATE SODIUM 100 MG: 100 CAPSULE, LIQUID FILLED ORAL at 09:13

## 2025-07-24 RX ADMIN — ATORVASTATIN CALCIUM 40 MG: 20 TABLET, FILM COATED ORAL at 21:51

## 2025-07-24 RX ADMIN — ACETAMINOPHEN 650 MG: 325 TABLET ORAL at 06:02

## 2025-07-24 SDOH — SOCIAL STABILITY: SOCIAL NETWORK: IN A TYPICAL WEEK, HOW MANY TIMES DO YOU TALK ON THE PHONE WITH FAMILY, FRIENDS, OR NEIGHBORS?: PATIENT DECLINED

## 2025-07-24 SDOH — SOCIAL STABILITY: SOCIAL INSECURITY
WITHIN THE LAST YEAR, HAVE YOU BEEN KICKED, HIT, SLAPPED, OR OTHERWISE PHYSICALLY HURT BY YOUR PARTNER OR EX-PARTNER?: PATIENT DECLINED

## 2025-07-24 SDOH — ECONOMIC STABILITY: FOOD INSECURITY: HOW HARD IS IT FOR YOU TO PAY FOR THE VERY BASICS LIKE FOOD, HOUSING, MEDICAL CARE, AND HEATING?: PATIENT DECLINED

## 2025-07-24 SDOH — ECONOMIC STABILITY: FOOD INSECURITY: WITHIN THE PAST 12 MONTHS, THE FOOD YOU BOUGHT JUST DIDN'T LAST AND YOU DIDN'T HAVE MONEY TO GET MORE.: PATIENT DECLINED

## 2025-07-24 SDOH — HEALTH STABILITY: MENTAL HEALTH: HOW OFTEN DO YOU HAVE A DRINK CONTAINING ALCOHOL?: 2-4 TIMES A MONTH

## 2025-07-24 SDOH — SOCIAL STABILITY: SOCIAL INSECURITY
WITHIN THE LAST YEAR, HAVE YOU BEEN HUMILIATED OR EMOTIONALLY ABUSED IN OTHER WAYS BY YOUR PARTNER OR EX-PARTNER?: PATIENT DECLINED

## 2025-07-24 SDOH — SOCIAL STABILITY: SOCIAL INSECURITY
WITHIN THE LAST YEAR, HAVE YOU BEEN RAPED OR FORCED TO HAVE ANY KIND OF SEXUAL ACTIVITY BY YOUR PARTNER OR EX-PARTNER?: PATIENT DECLINED

## 2025-07-24 SDOH — ECONOMIC STABILITY: INCOME INSECURITY
IN THE PAST 12 MONTHS HAS THE ELECTRIC, GAS, OIL, OR WATER COMPANY THREATENED TO SHUT OFF SERVICES IN YOUR HOME?: PATIENT DECLINED

## 2025-07-24 SDOH — HEALTH STABILITY: MENTAL HEALTH: HOW MANY DRINKS CONTAINING ALCOHOL DO YOU HAVE ON A TYPICAL DAY WHEN YOU ARE DRINKING?: 3 OR 4

## 2025-07-24 SDOH — ECONOMIC STABILITY: FOOD INSECURITY
WITHIN THE PAST 12 MONTHS, YOU WORRIED THAT YOUR FOOD WOULD RUN OUT BEFORE YOU GOT THE MONEY TO BUY MORE.: PATIENT DECLINED

## 2025-07-24 SDOH — HEALTH STABILITY: MENTAL HEALTH
DO YOU FEEL STRESS - TENSE, RESTLESS, NERVOUS, OR ANXIOUS, OR UNABLE TO SLEEP AT NIGHT BECAUSE YOUR MIND IS TROUBLED ALL THE TIME - THESE DAYS?: PATIENT DECLINED

## 2025-07-24 SDOH — HEALTH STABILITY: PHYSICAL HEALTH: ON AVERAGE, HOW MANY MINUTES DO YOU ENGAGE IN EXERCISE AT THIS LEVEL?: PATIENT DECLINED

## 2025-07-24 SDOH — SOCIAL STABILITY: SOCIAL NETWORK: HOW OFTEN DO YOU ATTEND MEETINGS OF THE CLUBS OR ORGANIZATIONS YOU BELONG TO?: PATIENT DECLINED

## 2025-07-24 SDOH — HEALTH STABILITY: MENTAL HEALTH: HOW OFTEN DO YOU HAVE SIX OR MORE DRINKS ON ONE OCCASION?: NEVER

## 2025-07-24 SDOH — ECONOMIC STABILITY: HOUSING INSECURITY: AT ANY TIME IN THE PAST 12 MONTHS, WERE YOU HOMELESS OR LIVING IN A SHELTER (INCLUDING NOW)?: PATIENT DECLINED

## 2025-07-24 SDOH — ECONOMIC STABILITY: TRANSPORTATION INSECURITY
IN THE PAST 12 MONTHS, HAS LACK OF TRANSPORTATION KEPT YOU FROM MEDICAL APPOINTMENTS OR FROM GETTING MEDICATIONS?: PATIENT DECLINED

## 2025-07-24 SDOH — ECONOMIC STABILITY: HOUSING INSECURITY: IN THE LAST 12 MONTHS, WAS THERE A TIME WHEN YOU WERE NOT ABLE TO PAY THE MORTGAGE OR RENT ON TIME?: PATIENT DECLINED

## 2025-07-24 SDOH — HEALTH STABILITY: PHYSICAL HEALTH
HOW OFTEN DO YOU NEED TO HAVE SOMEONE HELP YOU WHEN YOU READ INSTRUCTIONS, PAMPHLETS, OR OTHER WRITTEN MATERIAL FROM YOUR DOCTOR OR PHARMACY?: PATIENT DECLINES TO RESPOND

## 2025-07-24 SDOH — SOCIAL STABILITY: SOCIAL INSECURITY: ARE YOU MARRIED, WIDOWED, DIVORCED, SEPARATED, NEVER MARRIED, OR LIVING WITH A PARTNER?: PATIENT DECLINED

## 2025-07-24 SDOH — SOCIAL STABILITY: SOCIAL NETWORK: HOW OFTEN DO YOU ATTEND CHURCH OR RELIGIOUS SERVICES?: PATIENT DECLINED

## 2025-07-24 SDOH — SOCIAL STABILITY: SOCIAL INSECURITY: WITHIN THE LAST YEAR, HAVE YOU BEEN AFRAID OF YOUR PARTNER OR EX-PARTNER?: PATIENT DECLINED

## 2025-07-24 SDOH — SOCIAL STABILITY: SOCIAL NETWORK: HOW OFTEN DO YOU GET TOGETHER WITH FRIENDS OR RELATIVES?: PATIENT DECLINED

## 2025-07-24 SDOH — SOCIAL STABILITY: SOCIAL NETWORK
DO YOU BELONG TO ANY CLUBS OR ORGANIZATIONS SUCH AS CHURCH GROUPS, UNIONS, FRATERNAL OR ATHLETIC GROUPS, OR SCHOOL GROUPS?: PATIENT DECLINED

## 2025-07-24 SDOH — HEALTH STABILITY: PHYSICAL HEALTH
ON AVERAGE, HOW MANY DAYS PER WEEK DO YOU ENGAGE IN MODERATE TO STRENUOUS EXERCISE (LIKE A BRISK WALK)?: PATIENT DECLINED

## 2025-07-24 ASSESSMENT — COGNITIVE AND FUNCTIONAL STATUS - GENERAL
DAILY ACTIVITIY SCORE: 24
MOBILITY SCORE: 23
DRESSING REGULAR LOWER BODY CLOTHING: A LITTLE
MOBILITY SCORE: 24
MOBILITY SCORE: 24
DRESSING REGULAR UPPER BODY CLOTHING: A LITTLE
DAILY ACTIVITIY SCORE: 22
DAILY ACTIVITIY SCORE: 24
STANDING UP FROM CHAIR USING ARMS: A LITTLE
PATIENT BASELINE BEDBOUND: NO

## 2025-07-24 ASSESSMENT — ACTIVITIES OF DAILY LIVING (ADL)
TOILETING: INDEPENDENT
LACK_OF_TRANSPORTATION: PATIENT DECLINED
ADEQUATE_TO_COMPLETE_ADL: YES
BATHING_ASSISTANCE: INDEPENDENT
GROOMING: INDEPENDENT
DRESSING YOURSELF: NEEDS ASSISTANCE
HEARING - LEFT EAR: FUNCTIONAL
ASSISTIVE_DEVICE: EYEGLASSES
JUDGMENT_ADEQUATE_SAFELY_COMPLETE_DAILY_ACTIVITIES: YES
WALKS IN HOME: INDEPENDENT
FEEDING YOURSELF: INDEPENDENT
PATIENT'S MEMORY ADEQUATE TO SAFELY COMPLETE DAILY ACTIVITIES?: YES
HEARING - RIGHT EAR: FUNCTIONAL
EFFECT OF PAIN ON DAILY ACTIVITIES: YES
BATHING: INDEPENDENT
LACK_OF_TRANSPORTATION: PATIENT DECLINED

## 2025-07-24 ASSESSMENT — PAIN SCALES - GENERAL
PAINLEVEL_OUTOF10: 6
PAINLEVEL_OUTOF10: 6
PAINLEVEL_OUTOF10: 5 - MODERATE PAIN
PAINLEVEL_OUTOF10: 6
PAINLEVEL_OUTOF10: 0 - NO PAIN

## 2025-07-24 ASSESSMENT — PAIN DESCRIPTION - DESCRIPTORS: DESCRIPTORS: ACHING;BURNING;SPASM

## 2025-07-24 ASSESSMENT — LIFESTYLE VARIABLES
SKIP TO QUESTIONS 9-10: 0
AUDIT-C TOTAL SCORE: 3

## 2025-07-24 ASSESSMENT — PAIN - FUNCTIONAL ASSESSMENT
PAIN_FUNCTIONAL_ASSESSMENT: 0-10

## 2025-07-24 NOTE — CONSULTS
"Inpatient consult to Neurology  Consult performed by: Tsering Nicholas, DO  Consult ordered by: DONNA Pizano-CNP        History Of Present Illness  Kasey Byrne is a 54 y.o. female presenting with episode of loss of awareness, slurred speech and blurry vision. Her past medical history is significant for POTS/dysautonomia, migraines and EDS.     Yesterday, Kasey was in her typical state of health eating cherries and cleaning up in the kitchen when all of a sudden she realized she could not take a step forward.  Her vision then suddenly got very blurry and she was unable to interact with her  nor hear him speaking.  She was later told that she became very cold all over and appeared to be staring off and slurring her speech.  She does not fully remember this event and overall has patchy memory of the time after this event leading up to arriving to the hospital yesterday.  It was several hours before she fully returned back to her neurological baseline.  She did not fall with this event nor had tongue laceration.  She denies associated headache, facial droop or focal weakness with this event.  Event was overall different from the \"graying out\" that she will get from her dysautonomia.  She notes that this event is somewhat similar to an episode of syncope that she had in 2020. Event at that time was described as passing out without warning followed by convulsions. She was evaluated by [facility] neurologist in 12/2020 who noted that event could be syncope from her POTS but due to her children having epilepsy, epileptic event could not be fully excluded.     Of note, her event yesterday occurred after she took kratum which she will take about 1-2 times a month over the last 4 years for days when she has extreme spasms.  She has never had any side effects from this in the past.    Past Medical History  Medical History[1]  Surgical History  Surgical History[2]  Social History  Social " "History[3]  Allergies  Clindamycin, Erythromycin, Fluoxetine, Tetracyclines, Wheat, Wheat containing prod, Duloxetine, Latex, Levofloxacin, Zolpidem, and Lactose  Prescriptions Prior to Admission[4]    Review of Systems  Neurological Exam  Physical Exam    Neurologic Exam:    Mental Status:   Memory: intact short term memory   Attention/Concentration: intact attention on bedside test   Fund of knowledge: intact   Orientation: oriented to date location and person   Language: normal fluency comprehension repetition and naming   Speech: is fluent, no dysarthria    Cranial Nerves:  Pupils: OD 4 mm to 3 mm; OS 4 mm to 3 mm (no RAPD)  Visual Fields: (R) visual field full to confrontation; (L) visual field full to confrontation  CN III, CN IV, CN VI (Extraocular movements): extraocular eye movements intact.    CN V:  normal sensation in all three divisions of the trigeminal nerves, bilaterally.  CN VII: normal facial muscle strength bilaterally  CN VIII: auditory acuity intact to bedside testing  CN IX/CN X: normal palate elevation  CN XI: normal strength of trapezius and SCM muscles, bilaterally.  CN XII: tongue protrudes in the midline, no atrophy or fasciculations    Motor Exam:   Muscle Bulk: No atrophy or fasciculations   Muscle Tone: physiologic tone in upper and lower extremities   Involuntary movements: none  Pronator drift: absent   Strength:  [id number]     [id number]     [id number]    Sensory:   Light touch:intact in all 4 extremities.   Vibration:intact in all 4 extremities.      Reflexes:   RL  B2+2+   T  2+2+  BR 2+2+   P2+2+   A2+2+    Coordination:   FNF intact bilatearlly     Gait, Romberg: deferred      Last Recorded Vitals  Blood pressure 107/65, pulse 67, temperature 36.6 °C (97.9 °F), resp. rate 16, height 1.727 m (5' 7.99\"), weight 73.6 kg (162 lb 4.1 oz), SpO2 98%.    Relevant Results      NIH Stroke Scale  1A. Level of Consciousness: Alert, Keenly Responsive  1B. Ask Month and Age: " Both Questions Right  1C. Blink Eyes & Squeeze Hands: Performs Both Tasks  2. Best Gaze: Normal  3. Visual: No Visual Loss  4. Facial Palsy: Normal Symmetrical Movements  5A. Motor - Left Arm: No Drift  5B. Motor - Right Arm: No Drift  6A. Motor - Left Leg: No Drift  6B. Motor - Right Leg: No Drift  7. Limb Ataxia: Absent  8. Sensory Loss: Normal  9. Best Language: No Aphasia  10. Dysarthria: Normal  11. Extinction and Inattention: No Abnormality  NIH Stroke Scale: 0           Ball Coma Scale  Best Eye Response: Spontaneous  Best Verbal Response: Oriented  Best Motor Response: Follows commands  Ball Coma Scale Score: 15                Imaging  CT brain attack angio head and neck W and WO IV contrast  Result Date: 7/23/2025  No evidence of large vessel stenosis in the neck or intracranially. Multiple small bilateral thyroid nodules. Correlation with thyroid function tests recommended.   MACRO: None   Signed by: Kevin Arciniega 7/23/2025 11:30 PM Dictation workstation:   [id number]    CT brain attack head wo IV contrast  Result Date: 7/23/2025  No evidence of acute intracranial hemorrhage or mass effect.   If there is persistent concern for acute ischemia or intracranial process, MRI may be obtained for further evaluation as clinically indicated.   MACRO: Nicho Del Cid discussed the significance and urgency of this critical finding by telephone with SHELTON RAMSEY on 7/23/2025 at 10:39 pm. (**-RCF-**) Findings:  See findings.   Signed by: Nicho Del Cid 7/23/2025 10:40 PM Dictation workstation:   [id number]      Cardiology, Vascular, and Other Imaging  ECG 12 lead  Result Date: 7/24/2025  Unusual P axis, possible ectopic atrial rhythm Low voltage QRS Nonspecific T wave abnormality Prolonged QT Abnormal ECG No previous ECGs available See ED provider note for full interpretation and clinical correlation       Assessment/Plan   Assessment & Plan  Slurred speech    Spell of altered cognition      Transient episode of  decreased awareness, blurry vision and slurred speech, now fully back to baseline.  Her neuroexam is overall reassuring with no focal deficits noted.  Differential remains broad, including presyncope/dysautonomia from known POTS, focal seizure, or side effect from kratum. Lower suspicion for stroke based on clinical presentation but not fully excluded. Will obtain MRI brain, EEG and orthostatic vitals.     Plan;  - EEG  - MRI brain w/wo  - orthostatic vitals  - tele  - B12, TSH  - with history of migraines, patient will need to take caution with taking estrogen treatment  - outpatient follow up of incidental thyroid nodules     I spent 60 minutes in the professional and overall care of this patient.      Tsering Nicholas, DO         [1]   Past Medical History:  Diagnosis Date    Personal history of other diseases of the female genital tract     History of ovarian cyst    Personal history of other diseases of the musculoskeletal system and connective tissue     History of joint pain    Sleep deprivation     Sleep deficient   [2]   Past Surgical History:  Procedure Laterality Date    OTHER SURGICAL HISTORY  08/14/2020    Jaw surgery    OTHER SURGICAL HISTORY  08/14/2020    Dilation and curettage    OTHER SURGICAL HISTORY  08/14/2020    Nasal septoplasty    OTHER SURGICAL HISTORY  08/14/2020    Gallbladder surgery    OTHER SURGICAL HISTORY  08/14/2020    Varicose vein ligation   [3]   Social History  Tobacco Use    Smoking status: Never     Passive exposure: Never    Smokeless tobacco: Never   Vaping Use    Vaping status: Never Used   Substance Use Topics    Alcohol use: Yes     Alcohol/week: 2.0 - 4.0 standard drinks of alcohol     Types: 2 - 4 Standard drinks or equivalent per week    Drug use: Yes     Frequency: 7.0 times per week     Types: Marijuana, Other     Comment: uses in cooking   [4]   Medications Prior to Admission   Medication Sig Dispense Refill Last Dose/Taking    azelastine (Astelin) 137 mcg (0.1 %)  nasal spray Administer 1 spray into each nostril 2 times a day. Use in each nostril as directed   Past Month    cromolyn (Nasachrom) 5.2 mg/spray (4 %) nasal spray Administer 1 spray into each nostril 2 times a day.   Past Month    cromolyn (Opticrom) 4 % ophthalmic solution Administer 1 drop into both eyes 4 times a day.   Past Month    cyclobenzaprine (Flexeril) 10 mg tablet Take 1 tablet (10 mg) by mouth if needed for muscle spasms.   7/23/2025    estrogens-methyltestosterone (Estratest HS) 0.625-1.25 mg tablet Take 1 tablet by mouth once daily.   7/23/2025    lactase (Lactaid) 3,000 unit tablet Take 1 tablet (3,000 Units) by mouth 3 times a day as needed (when eating or drinking lactose foods).   Past Month    meloxicam (Mobic) 15 mg tablet Take 1 tablet (15 mg) by mouth once daily.   7/23/2025    methocarbamol (Robaxin) 750 mg tablet Take 1 tablet (750 mg) by mouth 3 times a day as needed for muscle spasms.   7/23/2025    progesterone (Prometrium) 200 mg capsule Take 1 capsule (200 mg) by mouth once daily at bedtime. Patient states she takes nightly except first 12 days of the month.   7/23/2025    ASTRAGALUS ROOT ORAL Take 1 capsule by mouth if needed (erler's danlos).   Unknown

## 2025-07-24 NOTE — PROGRESS NOTES
Physical Therapy    Physical Therapy Evaluation    Patient Name: Kasey Byrne  MRN: [id number]  Today's Date: 7/24/2025   Time Calculation  Start Time: 0932  Time Calculation (min): 18 min  1025/1025-A  Completion of this session, clinical decision making, and documentation performed under the supervision/direction of Bro Saleh PT.  Assessment/Plan   PT Assessment  Barriers to Discharge Home: No anticipated barriers  Evaluation/Treatment Tolerance: Patient limited by fatigue  Medical Staff Made Aware: Yes  End of Session Communication: Bedside nurse  Assessment Comment: PT eval only. Pt at baseline LOF  End of Session Patient Position: Up in chair, Alarm on  IP OR SWING BED PT PLAN  Inpatient or Swing Bed: Inpatient  PT Plan  PT Plan: PT Eval only  PT Eval Only Reason: At baseline function  PT Frequency: PT eval only  PT Discharge Recommendations: No PT needed after discharge, No further acute PT  Equipment Recommended upon Discharge:  (N/A)  PT Recommended Transfer Status: Independent  PT - OK to Discharge: Yes (Pt ready for discharge pending physician approval)  Subjective   Current Problem:  1. Stroke-like symptoms  Transthoracic Echo Complete    Transthoracic Echo Complete      2. Stroke, recent, without late effect  Transthoracic Echo Complete    Transthoracic Echo Complete      3. Multiple thyroid nodules  US thyroid        General Visit Information:  General  Reason for Referral: to assess functional mobility and strength  Referred By: PT/OT MILLER Morgan  Co-Treatment: OT  Co-Treatment Reason: to maximize pt safety  Prior to Session Communication: Bedside nurse (Cleared for therapy eval by RN)  Patient Position Received: Bed, 2 rail up, Alarm off, not on at start of session (Tele)  General Comment: Pt is a 53 y/o female who presents with disequilibrium, blurred vision and slurred speech. Head CT had no acute findings. Neck CT showed bilateral thyroid nodules.  Home Living:  Home Living  Home Living  Comments: Pt lives with her  and child. Pt lives in a one story home with a second story loft. Bedroom is on the second floor. Bathroom on the 1st floor. 3 steps to enter w/o a HR. W/I shower with no equiment.  Prior Level of Function:  Prior Function Per Pt/Caregiver Report  Prior Function Comments: Pt ambulates independently. Pt has episodes of weakness secondary to Ehler Danlos Syndrome. Pt is ind with ADLs and shares IADLs. 0 falls reported in the last 3 months and drives.  Precautions:  Precautions  Medical Precautions: Fall precautions  Objective   Pain:  Pain Assessment  Pain Assessment: 0-10  0-10 (Numeric) Pain Score: 6 (increased to 8 with activity)  Pain Type: Chronic pain, Acute pain  Pain Location:  (all muscles, and joints, low back is the main source of pain)  Pain Interventions: Repositioned  Response to Interventions: No change in pain  Cognition:  Cognition  Overall Cognitive Status: Within Functional Limits  Orientation Level: Oriented X4  General Assessments:  Activity Tolerance  Endurance: Tolerates less than 10 min exercise, no significant change in vital signs  Sensation  Light Touch: No apparent deficits  Coordination  Movements are Fluid and Coordinated: Yes  Postural Control  Postural Control: Within Functional Limits  Static Sitting Balance  Static Sitting-Comment/Number of Minutes: good  Dynamic Sitting Balance  Dynamic Sitting-Comments: good  Static Standing Balance  Static Standing-Comment/Number of Minutes: good -  Dynamic Standing Balance  Dynamic Standing-Comments: good -  Functional Assessments:  Bed Mobility  Bed Mobility: Yes  Bed Mobility 1  Bed Mobility 1: Supine to sitting  Level of Assistance 1: Independent  Transfers  Transfer: Yes  Transfer 1  Technique 1: Stand to sit, Sit to stand  Transfer Device 1:  (No device)  Transfer Level of Assistance 1: Independent  Ambulation/Gait Training  Ambulation/Gait Training Performed: Yes  Ambulation/Gait Training 1  Surface 1:  Level tile  Device 1: No device  Assistance 1: Independent  Quality of Gait 1: Decreased step length  Comments/Distance (ft) 1: Pt is ind for ambulating 50ft w/o an AD  Extremity/Trunk Assessments:  RUE   RUE :  (ROM and strength grossly WFL)  LUE   LUE:  (ROM and strength grossly WFL)  RLE   RLE :  (ROM grossly WFL, strength grossly 4+/5)  LLE   LLE :  (ROM grossly WFL, strength grossly 4+/5)  Outcome Measures:  [facility] Basic Mobility  Turning from your back to your side while in a flat bed without using bedrails: None  Moving from lying on your back to sitting on the side of a flat bed without using bedrails: None  Moving to and from bed to chair (including a wheelchair): None  Standing up from a chair using your arms (e.g. wheelchair or bedside chair): None  To walk in hospital room: None  Climbing 3-5 steps with railing: None  Basic Mobility - Total Score: 24

## 2025-07-24 NOTE — ED PROVIDER NOTES
HPI   No chief complaint on file.      Patient is a 54-year-old female with past medical history of Erler's Danlos, fibromyalgia, autonomic dysautonomia who presents to the ED for disequilibrium as well as blurred vision.  Patient states that she has felt like her equilibrium has been off all day today but tonight just after 2200 it became more severe and she noticed significant blurring of her visions.  She denies any numbness or weakness.              Patient History   Medical History[1]  Surgical History[2]  Family History[3]  Social History[4]    Physical Exam   ED Triage Vitals   Temp Pulse Resp BP   -- -- -- --      SpO2 Temp src Heart Rate Source Patient Position   -- -- -- --      BP Location FiO2 (%)     -- --       Physical Exam  Vitals and nursing note reviewed.   HENT:      Head: Normocephalic.      Nose: Nose normal.     Eyes:      Extraocular Movements: Extraocular movements intact.      Pupils: Pupils are equal, round, and reactive to light.       Cardiovascular:      Rate and Rhythm: Normal rate and regular rhythm.      Pulses: Normal pulses.      Heart sounds: No murmur heard.     No gallop.   Pulmonary:      Effort: Pulmonary effort is normal. No respiratory distress.      Breath sounds: Normal breath sounds. No wheezing.   Abdominal:      General: Abdomen is flat. There is no distension.      Palpations: Abdomen is soft.      Tenderness: There is no abdominal tenderness. There is no guarding.     Musculoskeletal:      Cervical back: Neck supple.     Skin:     General: Skin is warm and dry.     Neurological:      Mental Status: She is alert and oriented to person, place, and time.      Cranial Nerves: No cranial nerve deficit.      Sensory: No sensory deficit.      Comments: NIH stroke score of 1 for left lower extremity drift.         ED Course & MDM   Diagnoses as of 07/23/25 5725   Stroke-like symptoms                 No data recorded                                 Medical Decision Making  EKG  is interpreted by me as sinus rhythm with a ventricular rate of 82 bpm.  AL of 146 and QTc of 495.  No ST elevation or depression.      Patient presented from private vehicle and was brought in for concerning strokelike symptoms.  NIH stroke scale is 1 on my exam for left lower extremity drift.  She is Van negative.  A stroke alert was initiated.    I spoke to Dr Ba from neurology and he agreed that since the patient has had some of her symptoms all day today she is not a candidate for acute thrombolysis or acute surgical intervention.  He recommended obtaining CTA of the head neck in addition to the noncontrast exam.  He recommends that if the studies are negative the patient can be admitted at this facility for MRI and further workup.    Angiogram of the head neck shows no evidence of large vessel stenosis.  Multiple small bilateral thyroid nodules.    On my reevaluation of the patient she states that she is feeling like the vision changes have resolved and she is feeling much closer to her baseline.  Son who is now at bedside states that he did notice some slurred speech at home shortly prior to arrival here, but agrees she appears to be back to her baseline.    Case discussed with hospitalist team for admission.        Procedure  Procedures           [1]  Past Medical History:  Diagnosis Date   • Personal history of other diseases of the female genital tract     History of ovarian cyst   • Personal history of other diseases of the musculoskeletal system and connective tissue     History of joint pain   • Sleep deprivation     Sleep deficient   [2]  Past Surgical History:  Procedure Laterality Date   • OTHER SURGICAL HISTORY  08/14/2020    Jaw surgery   • OTHER SURGICAL HISTORY  08/14/2020    Dilation and curettage   • OTHER SURGICAL HISTORY  08/14/2020    Nasal septoplasty   • OTHER SURGICAL HISTORY  08/14/2020    Gallbladder surgery   • OTHER SURGICAL HISTORY  08/14/2020    Varicose vein ligation   [3]  No  family history on file.  [4]  Social History  Tobacco Use   • Smoking status: Not on file   • Smokeless tobacco: Not on file   Substance Use Topics   • Alcohol use: Not on file   • Drug use: Not on file      Corey Tai DO  07/23/25 1637

## 2025-07-24 NOTE — CARE PLAN
The patient's goals for the shift include sleep    The clinical goals for the shift include Initiate plan of care, comfort and safety    Over the shift, the patient did make progress toward the plan of care goals.

## 2025-07-24 NOTE — PROGRESS NOTES
Kasey Byrne is a 54 y.o. female on day 0 of admission presenting with Stroke-like symptoms.      Subjective   Patient examined and seen. Alert and oriented x3, resting comfortably.  Patient denies chest pain, shortness of breath, palpitations, abdominal pain, fever or chills.  She reports she has no further symptoms that are acute.     She has neuropathy in hands she has been dealing with over last 2-3 months.          Objective     Last Recorded Vitals  /65   Pulse 67   Temp 36.6 °C (97.9 °F)   Resp 16   Wt 73.6 kg (162 lb 4.1 oz)   SpO2 98%   Intake/Output last 3 Shifts:  No intake or output data in the 24 hours ending 07/24/25 1401    Admission Weight  Weight: 71.7 kg (158 lb) (07/23/25 2223)    Daily Weight  07/24/25 : 73.6 kg (162 lb 4.1 oz)    Image Results  ECG 12 lead  Unusual P axis, possible ectopic atrial rhythm  Low voltage QRS  Nonspecific T wave abnormality  Prolonged QT  Abnormal ECG  No previous ECGs available  See ED provider note for full interpretation and clinical correlation      Physical Exam  Constitutional: Well developed, awake/alert/oriented x3, , cooperative  Respiratory/Thorax: Patent airways,  normal breath sounds with Cardiovascular: Regular, rate and rhythm, n  Musculoskeletal: ROM intact, no joint swelling,   Extremities: normal extremities,  no contusions or wounds seen   Skin: warm, dry, intact  Neurological: alert/oriented x 3, speech clear, cranial nerves II through XII  grossly intact, no neurological deficits identified  Psychiatric: appropriate mood and behavior    Relevant Results     Scheduled medications  Scheduled Medications[1]  Continuous medications  Continuous Medications[2]  PRN medications  PRN Medications[3]    Results for orders placed or performed during the hospital encounter of 07/23/25 (from the past 24 hours)   POCT glucose   Result Value Ref Range    POCT Glucose 90 74 - 99 mg/dL   CBC and Auto Differential   Result Value Ref Range    WBC 8.0 4.4  - 11.3 x10*3/uL    nRBC 0.0 0.0 - 0.0 /100 WBCs    RBC 4.31 4.00 - 5.20 x10*6/uL    Hemoglobin 13.0 12.0 - 16.0 g/dL    Hematocrit 39.3 36.0 - 46.0 %    MCV 91 80 - 100 fL    MCH 30.2 26.0 - 34.0 pg    MCHC 33.1 32.0 - 36.0 g/dL    RDW 12.6 11.5 - 14.5 %    Platelets 280 150 - 450 x10*3/uL    Neutrophils % 46.5 40.0 - 80.0 %    Immature Granulocytes %, Automated 0.3 0.0 - 0.9 %    Lymphocytes % 46.4 13.0 - 44.0 %    Monocytes % 5.4 2.0 - 10.0 %    Eosinophils % 1.1 0.0 - 6.0 %    Basophils % 0.3 0.0 - 2.0 %    Neutrophils Absolute 3.72 1.20 - 7.70 x10*3/uL    Immature Granulocytes Absolute, Automated 0.02 0.00 - 0.70 x10*3/uL    Lymphocytes Absolute 3.70 1.20 - 4.80 x10*3/uL    Monocytes Absolute 0.43 0.10 - 1.00 x10*3/uL    Eosinophils Absolute 0.09 0.00 - 0.70 x10*3/uL    Basophils Absolute 0.02 0.00 - 0.10 x10*3/uL   Comprehensive metabolic panel   Result Value Ref Range    Glucose 92 74 - 99 mg/dL    Sodium 137 136 - 145 mmol/L    Potassium 3.5 3.5 - 5.3 mmol/L    Chloride 105 98 - 107 mmol/L    Bicarbonate 26 21 - 32 mmol/L    Anion Gap 10 10 - 20 mmol/L    Urea Nitrogen 15 6 - 23 mg/dL    Creatinine 1.06 (H) 0.50 - 1.05 mg/dL    eGFR 63 >60 mL/min/1.73m*2    Calcium 9.1 8.6 - 10.3 mg/dL    Albumin 4.3 3.4 - 5.0 g/dL    Alkaline Phosphatase 38 33 - 110 U/L    Total Protein 6.8 6.4 - 8.2 g/dL    AST 13 9 - 39 U/L    Bilirubin, Total 0.4 0.0 - 1.2 mg/dL    ALT 8 7 - 45 U/L   Troponin I, High Sensitivity   Result Value Ref Range    Troponin I, High Sensitivity <3 0 - 13 ng/L   Protime-INR   Result Value Ref Range    Protime 11.8 9.8 - 12.4 seconds    INR 1.1 0.9 - 1.1   APTT   Result Value Ref Range    aPTT 25 (L) 26 - 36 seconds   Lavender Top   Result Value Ref Range    Extra Tube Hold for add-ons.    Gray Top   Result Value Ref Range    Extra Tube Hold for add-ons.    ECG 12 lead   Result Value Ref Range    Ventricular Rate 82 BPM    Atrial Rate 82 BPM    MD Interval 146 ms    QRS Duration 88 ms    QT Interval  424 ms    QTC Calculation(Bazett) 495 ms    P Axis 150 degrees    R Axis 26 degrees    T Axis 116 degrees    QRS Count 13 beats    Q Onset 218 ms    P Onset 145 ms    P Offset 194 ms    T Offset 430 ms    QTC Fredericia 470 ms   POCT GLUCOSE   Result Value Ref Range    POCT Glucose 97 74 - 99 mg/dL   Comprehensive metabolic panel   Result Value Ref Range    Glucose 97 74 - 99 mg/dL    Sodium 140 136 - 145 mmol/L    Potassium 3.6 3.5 - 5.3 mmol/L    Chloride 109 (H) 98 - 107 mmol/L    Bicarbonate 26 21 - 32 mmol/L    Anion Gap 9 (L) 10 - 20 mmol/L    Urea Nitrogen 15 6 - 23 mg/dL    Creatinine 1.07 (H) 0.50 - 1.05 mg/dL    eGFR 63 >60 mL/min/1.73m*2    Calcium 8.3 (L) 8.6 - 10.3 mg/dL    Albumin 3.6 3.4 - 5.0 g/dL    Alkaline Phosphatase 30 (L) 33 - 110 U/L    Total Protein 5.3 (L) 6.4 - 8.2 g/dL    AST 10 9 - 39 U/L    Bilirubin, Total 0.3 0.0 - 1.2 mg/dL    ALT 7 7 - 45 U/L   CBC and Auto Differential   Result Value Ref Range    WBC 6.9 4.4 - 11.3 x10*3/uL    nRBC 0.0 0.0 - 0.0 /100 WBCs    RBC 3.72 (L) 4.00 - 5.20 x10*6/uL    Hemoglobin 11.3 (L) 12.0 - 16.0 g/dL    Hematocrit 33.3 (L) 36.0 - 46.0 %    MCV 90 80 - 100 fL    MCH 30.4 26.0 - 34.0 pg    MCHC 33.9 32.0 - 36.0 g/dL    RDW 12.8 11.5 - 14.5 %    Platelets 261 150 - 450 x10*3/uL    Neutrophils % 42.3 40.0 - 80.0 %    Immature Granulocytes %, Automated 0.1 0.0 - 0.9 %    Lymphocytes % 50.5 13.0 - 44.0 %    Monocytes % 5.7 2.0 - 10.0 %    Eosinophils % 1.3 0.0 - 6.0 %    Basophils % 0.1 0.0 - 2.0 %    Neutrophils Absolute 2.91 1.20 - 7.70 x10*3/uL    Immature Granulocytes Absolute, Automated 0.01 0.00 - 0.70 x10*3/uL    Lymphocytes Absolute 3.48 1.20 - 4.80 x10*3/uL    Monocytes Absolute 0.39 0.10 - 1.00 x10*3/uL    Eosinophils Absolute 0.09 0.00 - 0.70 x10*3/uL    Basophils Absolute 0.01 0.00 - 0.10 x10*3/uL   Magnesium   Result Value Ref Range    Magnesium 1.86 1.60 - 2.40 mg/dL   Lipid Panel   Result Value Ref Range    Cholesterol 122 0 - 199 mg/dL     HDL-Cholesterol 55.2 mg/dL    Cholesterol/HDL Ratio 2.2     LDL Calculated 57 <=99 mg/dL    VLDL 10 0 - 40 mg/dL    Triglycerides 50 0 - 149 mg/dL    Non HDL Cholesterol 67 0 - 149 mg/dL   Hemoglobin A1C   Result Value Ref Range    Hemoglobin A1C 4.9 See comment %    Estimated Average Glucose 94 Not Established mg/dL   B-Type Natriuretic Peptide   Result Value Ref Range    BNP 15 0 - 99 pg/mL   SST TOP   Result Value Ref Range    Extra Tube Hold for add-ons.    TSH with reflex to Free T4 if abnormal   Result Value Ref Range    Thyroid Stimulating Hormone 1.73 0.44 - 3.98 mIU/L   POCT GLUCOSE   Result Value Ref Range    POCT Glucose 99 74 - 99 mg/dL   POCT GLUCOSE   Result Value Ref Range    POCT Glucose 95 74 - 99 mg/dL   Drug Screen, Urine   Result Value Ref Range    Amphetamine Screen, Urine Presumptive Negative Presumptive Negative    Barbiturate Screen, Urine Presumptive Negative Presumptive Negative    Benzodiazepines Screen, Urine Presumptive Negative Presumptive Negative    Cannabinoid Screen, Urine Presumptive Positive (A) Presumptive Negative    Cocaine Metabolite Screen, Urine Presumptive Negative Presumptive Negative    Fentanyl Screen, Urine Presumptive Negative Presumptive Negative    Opiate Screen, Urine Presumptive Negative Presumptive Negative    Oxycodone Screen, Urine Presumptive Negative Presumptive Negative    PCP Screen, Urine Presumptive Negative Presumptive Negative    Methadone Screen, Urine Presumptive Negative Presumptive Negative   Urinalysis with Reflex Microscopic   Result Value Ref Range    Color, Urine Colorless (N) Light-Yellow, Yellow, Dark-Yellow    Appearance, Urine Clear Clear    Specific Gravity, Urine 1.008 1.005 - 1.035    pH, Urine 7.5 5.0, 5.5, 6.0, 6.5, 7.0, 7.5, 8.0    Protein, Urine NEGATIVE NEGATIVE, 10 (TRACE), 20 (TRACE) mg/dL    Glucose, Urine Normal Normal mg/dL    Blood, Urine NEGATIVE NEGATIVE mg/dL    Ketones, Urine NEGATIVE NEGATIVE mg/dL    Bilirubin, Urine  NEGATIVE NEGATIVE mg/dL    Urobilinogen, Urine Normal Normal mg/dL    Nitrite, Urine NEGATIVE NEGATIVE    Leukocyte Esterase, Urine NEGATIVE NEGATIVE                      Assessment & Plan    # Stroke-like symptoms  --Equilibrium issues, slurred speech, blurred vision bilaterally, increased lethargy - resolved   Hx: Asiya-Danlos w hypermotility  Hx: Autonomic dysfunction  Hx: Dysphonia chronic and intermittent -  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  - consult neuro- pending   - MRI/MRA- pending   - ASA, Statin  - tele  - echo in   in process   - neuro checks q4hr and NIH as needed  - Send UA and drug screen = + cannabis, negative UA   - resume home meds once rec complete  - Recommend patient follow up with Rheumatologist for Hx of Asiya Danlos- she does not currently follow with provider    # Thyroid nodules on Scan  TSH pending  Recommend ultrasound thyroid outpatient      DVTp: SCDs  Full Code  Diet: Regular  Disposition: home 24 hours pending Neurology recommendations   Lakeisha JENSEN triad rounding     Time spent  36  minutes obtaining labs, imaging, recommendations, interview, assessment, examination, medication review/ordering, and EMR review.    Plan of care was discussed extensively with patient. Patient verbalized understanding through teach back method. All questions and concerns addressed upon examination.     Of note, this documentation is completed using the Dragon Dictation system (voice recognition software). There may be spelling and/or grammatical errors that were not corrected prior to final submission.       Kajal De La Vega, APRN-CNP           [1] aspirin, 81 mg, oral, Daily   Or  aspirin, 81 mg, oral, Daily   Or  aspirin, 81 mg, nasogastric tube, Daily   Or  aspirin,  300 mg, rectal, Daily  atorvastatin, 40 mg, oral, Nightly  docusate sodium, 100 mg, oral, BID  insulin lispro, 0-5 Units, subcutaneous, TID AC  perflutren lipid microspheres, 0.5-10 mL of dilution, intravenous, Once in imaging  perflutren protein A microsphere, 0.5 mL, intravenous, Once in imaging  sulfur hexafluoride microsphr, 2 mL, intravenous, Once in imaging  [2]    [3] PRN medications: acetaminophen **OR** acetaminophen **OR** acetaminophen, dextrose, dextrose, glucagon, glucagon, labetaloL, ondansetron **OR** ondansetron, oxygen

## 2025-07-24 NOTE — PROGRESS NOTES
"Occupational Therapy    Evaluation    Patient Name: Kasey Byrne  MRN: [id number]  Department: [place] [id number]  Room: [address] [place] [address]  Today's Date: 7/24/2025  Time Calculation  Start Time: 0929  Stop Time: 0950  Time Calculation (min): 21 min      Assessment:  OT Assessment: Patient demonstrated ability to comple transfers, functional mobility and ADLs independently.  Prognosis: Excellent  Barriers to Discharge Home: No anticipated barriers  Evaluation/Treatment Tolerance: Patient tolerated treatment well  End of Session Communication: Bedside nurse  End of Session Patient Position: Up in chair, Alarm on, Call light within reach.  Prognosis: Excellent  Evaluation/Treatment Tolerance: Patient tolerated treatment well  Plan:  No Skilled OT: No acute OT goals identified  OT Frequency: OT eval only  OT Discharge Recommendations: No further acute OT  OT Recommended Transfer Status: Independent  OT - OK to Discharge: Yes (Once medically appropriate.)     Subjective       OT Visit Info:  OT Received On: 07/24/25  General:  General  Reason for Referral: ADLs  Referred By: EDI Morgan CNP  Past Medical History Relevant to Rehab: Erlers Danlos Syndrome, Fibromyalgia, Autonomic dysautonomia, Jaw sx, Dyshonia, Remote DVT  Co-Treatment: PT  Co-Treatment Reason: To maximize functional outcomes and patient safety.  Prior to Session Communication: Bedside nurse (Cleared for therapy evaluation by RN.)  Patient Position Received: Bed, 2 rail up, Alarm off, not on at start of session  General Comment: Patient presented with c/o equilibrium issues, slurred speech, blurred vision B/L and increased lethargy. Dx: Stroke like symptoms. CT brain: (-) acute. MRI brain pending. Neuro consulted.             Pain:  Pain Assessment  Pain Assessment: 0-10  0-10 (Numeric) Pain Score: 6  Pain Type: Chronic pain  Pain Location:  (\"All over.\")  Pain Interventions: Repositioned  Response to Interventions: No change in pain    Objective   Cognition:  Overall " Cognitive Status: Within Functional Limits  Orientation Level: Oriented X4           Home Living:  Home Living Comments: Patient lives with  and son (able to assist) in a 1 story house with a loft, 3 PAU without a HR. Patient has a spiral staircase to the loft. Bedroom on the 2nd level however able to stay on the main level. Walk in shower without any DME on the main level.    Prior Function:  Prior Function Comments: Patient ambulates independently without a device, owns a walking stick. Independent with ADLs and IADLs. Denies falls in the past 3 months. Drives.     ADL:  Eating Assistance: Independent  Grooming Assistance: Independent  Bathing Assistance: Independent  UE Dressing Assistance: Independent  LE Dressing Assistance: Independent  Toileting Assistance with Device: Independent  Functional Assistance: Independent    Bed Mobility/Transfers: Bed Mobility  Bed Mobility: Yes  Bed Mobility 1  Bed Mobility 1: Supine to sitting  Bed Mobility Comments 1: HOB elevated.       Functional Mobility:  Functional Mobility  Functional Mobility Performed: Yes  Functional Mobility 1  Comments 1: Patient completed functional mobility without a device independently. No LOB. Guarded d/t chronic low back pain.     Standing Balance:  Dynamic Standing Balance  Dynamic Standing-Comments: Good      Vision:Vision - Basic Assessment  Patient Visual Report:  (Denies visual deficits.)   and    Sensation: c/o chronic numbness to B/L hands however able to identify light touch to B/L hands.     Strength:  Strength Comments: B/L UE MMT 4/5.     Coordination:  Coordination Comment: Coordination intact B/L.      Extremities: RUE   RUE : Within Functional Limits and LUE   LUE: Within Functional Limits    Outcome Measures:[facility] Daily Activity  Putting on and taking off regular lower body clothing: None  Bathing (including washing, rinsing, drying): None  Putting on and taking off regular upper body clothing: None  Toileting, which  includes using toilet, bedpan or urinal: None  Taking care of personal grooming such as brushing teeth: None  Eating Meals: None  Daily Activity - Total Score: 24    EDUCATION:  Education  Individual(s) Educated: Patient  Education Provided: Fall precautons, POC discussed and agreed upon, Risk and benefits of OT discussed with patient or other  Patient Response to Education: Patient/Caregiver Verbalized Understanding of Information

## 2025-07-24 NOTE — H&P
" Medical Group History and Physical    ASSESSMENT & PLAN:     Stroke-like symptoms  --Equilibrium issues, slurred speech, blurred vision bilaterally, increased lethargy  Hx: Asiya-Danlos w hypermotility  Hx: Autonomic dysfunction  Hx: Dysphonia chronic and intermittent -  Hx: Remote DVT/ on hormone therapy for menopause  7/23/25 Self reported - Drug use - Kraytom for self management of sx r/t Asiya-Danlos    LKW 9am, not on AC, Head CT negative, NIH 1 on arrival - Full work-up ordered admit as inpatient to regular nursing floor on tele.  - consult neuro  - MRI/MRA  - ASA, Statin  - tele  - echo in AM  - neuro checks q4hr and NIH as needed  - Send UA and drug screen  - IVF hydration x1L   - ok to start diet  - repeat labs in AM - unremarkable first set  - resume home meds once rec complete    VTE Prophylaxis: defer    DONNA Pizano-CNP    HISTORY OF PRESENT ILLNESS:   Chief Complaint: stroke-like symptoms    History Of Present Illness:    Kasey Byrne is a 54 y.o. female with a significant past medical history of Hnzjxp-Wqkgod-gtzlavyjatepr, autonomic dysfunction, dysphonia - chronic/intermittent, remote DVT not on anticoagulation but IS on hormone therapy for menopause presenting to [place] ER with family for concerns with new stroke-like symptoms. LKW 9 AM, no AC, glucose 82, NIH 1 on arrival to ER.      Patient states earlier today when she woke she knew she was in a hypermotility state, arms felt numb, hands felt swollen like she was unable to grasp things, felt very fatigued and had difficulty moving forward throughout the day. She reports this was overwhelming for her. She was able to eat and drink without issue, no fevers chills nausea vomiting diarrhea.  When she has these hyper spastic states she occasionally takes \"kratom\" however given the significant change in her mentation, lethargy drooling staring off - and per son also with flaccidity of bilateral upper and lower extremities;  I believe " this is less likely an acute CVA and more likely a result of taking the drug - which she states is the usual desired effect but admitted possible she took too much.      She is now back to her baseline mentation and speech, NIH 0, vision is normal, spastic/flaccid muscles are at her baseline. Noted to be Slightly hypotensive SBP 95 and IV fluids are infusing.     Head CT was performed and is negative for acute bleed.   UA and toxicology panel - added on.    Labs were unremarkable    Given her history of hormone therapy for menopause and remote DVT [not on anticoagulation] its not unlikely that she may have developed these stroke-like symptoms from the use of the hormones.  Will defer ongoing treatment to her specialists; cont following MRI MRA of head brain neck, and Echo to r/o cardiovascular causes. Start ASA and Statin, repeat blood work, and admit to in-patient for full stroke work-up.    VSS and ready for admission to general medicine for Neuro eval of Stroke-like symptoms.    Review of systems: 10 point review of systems is otherwise negative except as mentioned above.    PAST HISTORIES:       Past Medical History:  Medical Problems       Problem List       * (Principal) Stroke-like symptoms    Overview Signed 7/23/2025 11:57 PM by MOY Pizano   LKW 9am out of window, Head CT negative for bleed  Neuro consult and stroke work-up started         Abnormal uterine bleeding (AUB)    Anxiety and depression    Autonomic dysfunction    Dysphonia    Overview Signed 7/23/2025 11:56 PM by MOY Pizano   Chronic intermittent         Erosive gastroesophageal reflux disease    History of DVT (deep vein thrombosis)    Hypermobility of joint    IBS (irritable bowel syndrome)    Long Q-T syndrome    Orthostatic hypotension    CHANDLER (obstructive sleep apnea)    Overview Signed 7/23/2025 11:56 PM by MOY Pizano   Added automatically from request for surgery [id number]         Uday  "hypermobile type (HHS-HCC)    Pericardial effusion (noninflammatory) (HHS-HCC)    Raynaud phenomenon    Vasovagal syncope           Past Surgical History:  Surgical History[1]       Social History:  She has no history on file for tobacco use, alcohol use, and drug use.    Family History:  Family History[2]     Allergies:  Clindamycin, Tetracyclines, Wheat containing prod, Fluoxetine, Lactose, Latex, Levofloxacin, Zolpidem, Duloxetine, Erythromycin, and Wheat    OBJECTIVE:       Last Recorded Vitals:  Vitals:    07/23/25 2223 07/23/25 2230 07/23/25 2320   BP:  112/76 114/72   BP Location:  Right arm    Patient Position:  Lying    Pulse:  63 75   Resp:  17 16   Temp:  36.4 °C (97.5 °F) 36.9 °C (98.4 °F)   TempSrc:  Temporal Temporal   SpO2:  100% 99%   Weight: 71.7 kg (158 lb)     Height: 1.727 m (5' 8\")         Last I/O:  No intake/output data recorded.    Physical Exam  Vitals and nursing note reviewed.   Constitutional:       Appearance: Normal appearance. She is normal weight.      Comments: Chronically ill   HENT:      Head: Normocephalic and atraumatic.      Nose: Nose normal.      Mouth/Throat:      Mouth: Mucous membranes are moist.      Pharynx: Oropharynx is clear.     Eyes:      Extraocular Movements: Extraocular movements intact.      Conjunctiva/sclera: Conjunctivae normal.      Pupils: Pupils are equal, round, and reactive to light.       Cardiovascular:      Rate and Rhythm: Normal rate and regular rhythm.      Pulses: Normal pulses.      Heart sounds: Normal heart sounds.   Pulmonary:      Effort: Pulmonary effort is normal.      Breath sounds: Normal breath sounds.      Comments: RA  Abdominal:      General: Abdomen is flat.      Palpations: Abdomen is soft.      Comments: Fluid retention from abd to knees - chronic but worsening   Genitourinary:     Comments: Not assessed    Musculoskeletal:         General: Normal range of motion.      Cervical back: Normal range of motion and neck supple.      " Comments: No pitting edema       Skin:     General: Skin is warm and dry.      Capillary Refill: Capillary refill takes less than 2 seconds.     Neurological:      General: No focal deficit present.      Mental Status: She is alert and oriented to person, place, and time. Mental status is at baseline.      Motor: Weakness present.      Comments: Generalized weakness; NIH 0   Psychiatric:         Mood and Affect: Mood normal.         Behavior: Behavior normal.         Thought Content: Thought content normal.         Judgment: Judgment normal.           Scheduled Medications  Scheduled Medications[3]  PRN Medications  PRN Medications[4]  Continuous Medications  Continuous Medications[5]    Outpatient Medications:  Prior to Admission medications   Not on File       LABS AND IMAGING:     Labs:  Results for orders placed or performed during the hospital encounter of 07/23/25 (from the past 24 hours)   POCT glucose   Result Value Ref Range    POCT Glucose 90 74 - 99 mg/dL   CBC and Auto Differential   Result Value Ref Range    WBC 8.0 4.4 - 11.3 x10*3/uL    nRBC 0.0 0.0 - 0.0 /100 WBCs    RBC 4.31 4.00 - 5.20 x10*6/uL    Hemoglobin 13.0 12.0 - 16.0 g/dL    Hematocrit 39.3 36.0 - 46.0 %    MCV 91 80 - 100 fL    MCH 30.2 26.0 - 34.0 pg    MCHC 33.1 32.0 - 36.0 g/dL    RDW 12.6 11.5 - 14.5 %    Platelets 280 150 - 450 x10*3/uL    Neutrophils % 46.5 40.0 - 80.0 %    Immature Granulocytes %, Automated 0.3 0.0 - 0.9 %    Lymphocytes % 46.4 13.0 - 44.0 %    Monocytes % 5.4 2.0 - 10.0 %    Eosinophils % 1.1 0.0 - 6.0 %    Basophils % 0.3 0.0 - 2.0 %    Neutrophils Absolute 3.72 1.20 - 7.70 x10*3/uL    Immature Granulocytes Absolute, Automated 0.02 0.00 - 0.70 x10*3/uL    Lymphocytes Absolute 3.70 1.20 - 4.80 x10*3/uL    Monocytes Absolute 0.43 0.10 - 1.00 x10*3/uL    Eosinophils Absolute 0.09 0.00 - 0.70 x10*3/uL    Basophils Absolute 0.02 0.00 - 0.10 x10*3/uL   Comprehensive metabolic panel   Result Value Ref Range    Glucose 92  74 - 99 mg/dL    Sodium 137 136 - 145 mmol/L    Potassium 3.5 3.5 - 5.3 mmol/L    Chloride 105 98 - 107 mmol/L    Bicarbonate 26 21 - 32 mmol/L    Anion Gap 10 10 - 20 mmol/L    Urea Nitrogen 15 6 - 23 mg/dL    Creatinine 1.06 (H) 0.50 - 1.05 mg/dL    eGFR 63 >60 mL/min/1.73m*2    Calcium 9.1 8.6 - 10.3 mg/dL    Albumin 4.3 3.4 - 5.0 g/dL    Alkaline Phosphatase 38 33 - 110 U/L    Total Protein 6.8 6.4 - 8.2 g/dL    AST 13 9 - 39 U/L    Bilirubin, Total 0.4 0.0 - 1.2 mg/dL    ALT 8 7 - 45 U/L   Troponin I, High Sensitivity   Result Value Ref Range    Troponin I, High Sensitivity <3 0 - 13 ng/L   Protime-INR   Result Value Ref Range    Protime 11.8 9.8 - 12.4 seconds    INR 1.1 0.9 - 1.1   APTT   Result Value Ref Range    aPTT 25 (L) 26 - 36 seconds   Lavender Top   Result Value Ref Range    Extra Tube Hold for add-ons.    Gray Top   Result Value Ref Range    Extra Tube Hold for add-ons.         Imaging:  CT brain attack angio head and neck W and WO IV contrast   Final Result   No evidence of large vessel stenosis in the neck or intracranially.   Multiple small bilateral thyroid nodules. Correlation with thyroid   function tests recommended.        MACRO:   None        Signed by: Kevin Arciniega 7/23/2025 11:30 PM   Dictation workstation:   [id number]      CT brain attack head wo IV contrast   Final Result   No evidence of acute intracranial hemorrhage or mass effect.        If there is persistent concern for acute ischemia or intracranial   process, MRI may be obtained for further evaluation as clinically   indicated.        MACRO:   Nicho Del Cid discussed the significance and urgency of this critical   finding by telephone with SHELTON RAMSEY on 7/23/2025 at 10:39 pm.   (**-RCF-**) Findings:  See findings.        Signed by: Nicho Del Cid 7/23/2025 10:40 PM   Dictation workstation:   [id number]                [1]   Past Surgical History:  Procedure Laterality Date    OTHER SURGICAL HISTORY  08/14/2020    Jaw  surgery    OTHER SURGICAL HISTORY  08/14/2020    Dilation and curettage    OTHER SURGICAL HISTORY  08/14/2020    Nasal septoplasty    OTHER SURGICAL HISTORY  08/14/2020    Gallbladder surgery    OTHER SURGICAL HISTORY  08/14/2020    Varicose vein ligation   [2] No family history on file.  [3] sodium chloride, 1,000 mL, intravenous, Once    [4] [5]

## 2025-07-24 NOTE — PROGRESS NOTES
07/24/25 1349   Discharge Planning   Living Arrangements Spouse/significant other   Support Systems Spouse/significant other   Assistance Needed Independent   Type of Residence Private residence   Number of Stairs to Enter Residence 0   Number of Stairs Within Residence 0   Who is requesting discharge planning? Provider   Home or Post Acute Services None   Expected Discharge Disposition Home   Does the patient need discharge transport arranged? No   Patient Choice   Provider Choice list and CMS website (https://medicare.gov/care-compare#search) for post-acute Quality and Resource Measure Data were provided and reviewed with: Patient   Patient / Family choosing to utilize agency / facility established prior to hospitalization No   Stroke Family Assessment   Stroke Family Assessment Needed No   Intensity of Service   Intensity of Service 0-30 min     Patient admitted for r/o stroke, has been ruled out. Patient is for echocardiogram. Patient lives at home with spouse, is independent. Plan will be home, no needs. No residual from admitting symptoms. CT team to follow.

## 2025-07-24 NOTE — CARE PLAN
The patient's goals for the shift include sleep    The clinical goals for the shift include Initiate plan of care, comfort and safety

## 2025-07-25 ENCOUNTER — APPOINTMENT (OUTPATIENT)
Dept: NEUROLOGY | Facility: HOSPITAL | Age: 55
End: 2025-07-25
Payer: MEDICARE

## 2025-07-25 VITALS
HEART RATE: 61 BPM | SYSTOLIC BLOOD PRESSURE: 104 MMHG | WEIGHT: 162.26 LBS | OXYGEN SATURATION: 97 % | HEIGHT: 68 IN | TEMPERATURE: 97.2 F | DIASTOLIC BLOOD PRESSURE: 53 MMHG | BODY MASS INDEX: 24.59 KG/M2 | RESPIRATION RATE: 18 BRPM

## 2025-07-25 LAB
ALBUMIN SERPL BCP-MCNC: 3.9 G/DL (ref 3.4–5)
ALP SERPL-CCNC: 35 U/L (ref 33–110)
ALT SERPL W P-5'-P-CCNC: 7 U/L (ref 7–45)
ANION GAP SERPL CALC-SCNC: 9 MMOL/L (ref 10–20)
AST SERPL W P-5'-P-CCNC: 11 U/L (ref 9–39)
BASOPHILS # BLD AUTO: 0.02 X10*3/UL (ref 0–0.1)
BASOPHILS NFR BLD AUTO: 0.3 %
BILIRUB SERPL-MCNC: 0.4 MG/DL (ref 0–1.2)
BUN SERPL-MCNC: 9 MG/DL (ref 6–23)
CALCIUM SERPL-MCNC: 9 MG/DL (ref 8.6–10.3)
CHLORIDE SERPL-SCNC: 109 MMOL/L (ref 98–107)
CO2 SERPL-SCNC: 26 MMOL/L (ref 21–32)
CREAT SERPL-MCNC: 0.73 MG/DL (ref 0.5–1.05)
EGFRCR SERPLBLD CKD-EPI 2021: >90 ML/MIN/1.73M*2
EOSINOPHIL # BLD AUTO: 0.08 X10*3/UL (ref 0–0.7)
EOSINOPHIL NFR BLD AUTO: 1.3 %
ERYTHROCYTE [DISTWIDTH] IN BLOOD BY AUTOMATED COUNT: 12.7 % (ref 11.5–14.5)
GLUCOSE BLD MANUAL STRIP-MCNC: 94 MG/DL (ref 74–99)
GLUCOSE SERPL-MCNC: 94 MG/DL (ref 74–99)
HCT VFR BLD AUTO: 37.6 % (ref 36–46)
HGB BLD-MCNC: 12.6 G/DL (ref 12–16)
HOLD SPECIMEN: NORMAL
IMM GRANULOCYTES # BLD AUTO: 0.01 X10*3/UL (ref 0–0.7)
IMM GRANULOCYTES NFR BLD AUTO: 0.2 % (ref 0–0.9)
LYMPHOCYTES # BLD AUTO: 3 X10*3/UL (ref 1.2–4.8)
LYMPHOCYTES NFR BLD AUTO: 48.9 %
MAGNESIUM SERPL-MCNC: 1.75 MG/DL (ref 1.6–2.4)
MCH RBC QN AUTO: 30 PG (ref 26–34)
MCHC RBC AUTO-ENTMCNC: 33.5 G/DL (ref 32–36)
MCV RBC AUTO: 90 FL (ref 80–100)
MONOCYTES # BLD AUTO: 0.35 X10*3/UL (ref 0.1–1)
MONOCYTES NFR BLD AUTO: 5.7 %
NEUTROPHILS # BLD AUTO: 2.67 X10*3/UL (ref 1.2–7.7)
NEUTROPHILS NFR BLD AUTO: 43.6 %
NRBC BLD-RTO: 0 /100 WBCS (ref 0–0)
PLATELET # BLD AUTO: 281 X10*3/UL (ref 150–450)
POTASSIUM SERPL-SCNC: 3.6 MMOL/L (ref 3.5–5.3)
PROT SERPL-MCNC: 6.2 G/DL (ref 6.4–8.2)
RBC # BLD AUTO: 4.2 X10*6/UL (ref 4–5.2)
SODIUM SERPL-SCNC: 140 MMOL/L (ref 136–145)
VIT B12 SERPL-MCNC: 387 PG/ML (ref 211–911)
WBC # BLD AUTO: 6.1 X10*3/UL (ref 4.4–11.3)

## 2025-07-25 PROCEDURE — 99232 SBSQ HOSP IP/OBS MODERATE 35: CPT

## 2025-07-25 PROCEDURE — 82947 ASSAY GLUCOSE BLOOD QUANT: CPT

## 2025-07-25 PROCEDURE — 85025 COMPLETE CBC W/AUTO DIFF WBC: CPT | Performed by: NURSE PRACTITIONER

## 2025-07-25 PROCEDURE — 2500000001 HC RX 250 WO HCPCS SELF ADMINISTERED DRUGS (ALT 637 FOR MEDICARE OP): Performed by: NURSE PRACTITIONER

## 2025-07-25 PROCEDURE — 83735 ASSAY OF MAGNESIUM: CPT | Performed by: NURSE PRACTITIONER

## 2025-07-25 PROCEDURE — 99238 HOSP IP/OBS DSCHRG MGMT 30/<: CPT

## 2025-07-25 PROCEDURE — 95819 EEG AWAKE AND ASLEEP: CPT | Performed by: STUDENT IN AN ORGANIZED HEALTH CARE EDUCATION/TRAINING PROGRAM

## 2025-07-25 PROCEDURE — 95819 EEG AWAKE AND ASLEEP: CPT

## 2025-07-25 PROCEDURE — 36415 COLL VENOUS BLD VENIPUNCTURE: CPT | Performed by: NURSE PRACTITIONER

## 2025-07-25 PROCEDURE — 80053 COMPREHEN METABOLIC PANEL: CPT | Performed by: NURSE PRACTITIONER

## 2025-07-25 RX ORDER — VIT C/E/ZN/COPPR/LUTEIN/ZEAXAN 250MG-90MG
1000 CAPSULE ORAL DAILY
Status: DISCONTINUED | OUTPATIENT
Start: 2025-07-25 | End: 2025-07-25 | Stop reason: HOSPADM

## 2025-07-25 RX ADMIN — DOCUSATE SODIUM 100 MG: 100 CAPSULE, LIQUID FILLED ORAL at 09:57

## 2025-07-25 RX ADMIN — ASPIRIN 81 MG: 81 TABLET, CHEWABLE ORAL at 09:57

## 2025-07-25 ASSESSMENT — COGNITIVE AND FUNCTIONAL STATUS - GENERAL
MOBILITY SCORE: 24
MOBILITY SCORE: 24
DAILY ACTIVITIY SCORE: 24
DAILY ACTIVITIY SCORE: 24

## 2025-07-25 ASSESSMENT — PAIN - FUNCTIONAL ASSESSMENT: PAIN_FUNCTIONAL_ASSESSMENT: 0-10

## 2025-07-25 ASSESSMENT — PAIN SCALES - GENERAL
PAINLEVEL_OUTOF10: 4
PAINLEVEL_OUTOF10: 0 - NO PAIN

## 2025-07-25 NOTE — DISCHARGE SUMMARY
"Discharge Diagnosis  Stroke-like symptoms       Issues Requiring Follow-Up  Follow-up with neurology for strokelike symptoms    Discharge Meds     Medication List      CONTINUE taking these medications     ASTRAGALUS ROOT ORAL   azelastine 137 mcg (0.1 %) nasal spray; Commonly known as: Astelin   * cromolyn 5.2 mg/spray (4 %) nasal spray; Commonly known as: Nasachrom   * cromolyn 4 % ophthalmic solution; Commonly known as: Opticrom   cyclobenzaprine 10 mg tablet; Commonly known as: Flexeril   estrogens-methyltestosterone 0.625-1.25 mg tablet; Commonly known as:   Estratest HS   lactase 3,000 unit tablet; Commonly known as: Lactaid   meloxicam 15 mg tablet; Commonly known as: Mobic   methocarbamol 750 mg tablet; Commonly known as: Robaxin   progesterone 200 mg capsule; Commonly known as: Prometrium  * This list has 2 medication(s) that are the same as other medications   prescribed for you. Read the directions carefully, and ask your doctor or   other care provider to review them with you.       Test Results Pending At Discharge  Pending Labs       Order Current Status    Methylmalonic Acid In process    Extra Tubes Preliminary result    SST TOP Preliminary result            Hospital Course   Patient is a 54-year-old female with past medical history of Asiya-Danlos hypermotility, autonomic dysfunction, dysphonia, remote DVT not on anticoagulation presented to the ER for concerns for strokelike symptoms.  Patient's NIH was 1 on arrival.  Neurology was consulted.  CT head was negative.  MRI was negative.  Echo with bubble study was negative.  Patient does report taking \"Kratom\" for hyper spasticity.  Patient was back to her baseline for admission.  EEG today showed no abnormalities.  Patient will be discharged with follow-up to neurology.    Pertinent Physical Exam At Time of Discharge  Physical Exam  Constitutional:       General: She is not in acute distress.     Appearance: She is not ill-appearing.   HENT:      " Head: Normocephalic.      Mouth/Throat:      Mouth: Mucous membranes are dry.     Cardiovascular:      Rate and Rhythm: Normal rate.      Pulses: Normal pulses.   Pulmonary:      Effort: Pulmonary effort is normal.   Abdominal:      General: Abdomen is flat.      Palpations: Abdomen is soft.     Musculoskeletal:         General: Normal range of motion.     Skin:     General: Skin is warm and dry.      Capillary Refill: Capillary refill takes less than 2 seconds.     Neurological:      General: No focal deficit present.      Mental Status: She is alert and oriented to person, place, and time. Mental status is at baseline.         Outpatient Follow-Up  No future appointments.      Tanisha Lopez, APRN-CNP

## 2025-07-25 NOTE — PROGRESS NOTES
"Kasey Byrne is a 54 y.o. female on day 1 of admission presenting with Stroke-like symptoms.      Subjective   Pt. Sitting in chair. She feels a little foggy, but almost back to baseline. We discussed her MRI brain being negative for acute intracranial pathology, rEEG awake/drowsy, no epileptiform discharges, seizures or lateralizing signs seen. We discussed increase risk for stroke while on HRT with hx of migraine with aura. She states she was informed about this. Plan is for discharge home today.   Review of systems are negative unless otherwise specified in HPI.        Objective     Last Recorded Vitals  Blood pressure 104/53, pulse 61, temperature 36.2 °C (97.2 °F), temperature source Temporal, resp. rate 18, height 1.727 m (5' 7.99\"), weight 73.6 kg (162 lb 4.1 oz), SpO2 97%.      Relevant Results  Results for orders placed or performed during the hospital encounter of 07/23/25 (from the past 96 hours)   POCT glucose   Result Value Ref Range    POCT Glucose 90 74 - 99 mg/dL   CBC and Auto Differential   Result Value Ref Range    WBC 8.0 4.4 - 11.3 x10*3/uL    nRBC 0.0 0.0 - 0.0 /100 WBCs    RBC 4.31 4.00 - 5.20 x10*6/uL    Hemoglobin 13.0 12.0 - 16.0 g/dL    Hematocrit 39.3 36.0 - 46.0 %    MCV 91 80 - 100 fL    MCH 30.2 26.0 - 34.0 pg    MCHC 33.1 32.0 - 36.0 g/dL    RDW 12.6 11.5 - 14.5 %    Platelets 280 150 - 450 x10*3/uL    Neutrophils % 46.5 40.0 - 80.0 %    Immature Granulocytes %, Automated 0.3 0.0 - 0.9 %    Lymphocytes % 46.4 13.0 - 44.0 %    Monocytes % 5.4 2.0 - 10.0 %    Eosinophils % 1.1 0.0 - 6.0 %    Basophils % 0.3 0.0 - 2.0 %    Neutrophils Absolute 3.72 1.20 - 7.70 x10*3/uL    Immature Granulocytes Absolute, Automated 0.02 0.00 - 0.70 x10*3/uL    Lymphocytes Absolute 3.70 1.20 - 4.80 x10*3/uL    Monocytes Absolute 0.43 0.10 - 1.00 x10*3/uL    Eosinophils Absolute 0.09 0.00 - 0.70 x10*3/uL    Basophils Absolute 0.02 0.00 - 0.10 x10*3/uL   Comprehensive metabolic panel   Result Value Ref " Range    Glucose 92 74 - 99 mg/dL    Sodium 137 136 - 145 mmol/L    Potassium 3.5 3.5 - 5.3 mmol/L    Chloride 105 98 - 107 mmol/L    Bicarbonate 26 21 - 32 mmol/L    Anion Gap 10 10 - 20 mmol/L    Urea Nitrogen 15 6 - 23 mg/dL    Creatinine 1.06 (H) 0.50 - 1.05 mg/dL    eGFR 63 >60 mL/min/1.73m*2    Calcium 9.1 8.6 - 10.3 mg/dL    Albumin 4.3 3.4 - 5.0 g/dL    Alkaline Phosphatase 38 33 - 110 U/L    Total Protein 6.8 6.4 - 8.2 g/dL    AST 13 9 - 39 U/L    Bilirubin, Total 0.4 0.0 - 1.2 mg/dL    ALT 8 7 - 45 U/L   Troponin I, High Sensitivity   Result Value Ref Range    Troponin I, High Sensitivity <3 0 - 13 ng/L   Protime-INR   Result Value Ref Range    Protime 11.8 9.8 - 12.4 seconds    INR 1.1 0.9 - 1.1   APTT   Result Value Ref Range    aPTT 25 (L) 26 - 36 seconds   Lavender Top   Result Value Ref Range    Extra Tube Hold for add-ons.    Gray Top   Result Value Ref Range    Extra Tube Hold for add-ons.    ECG 12 lead   Result Value Ref Range    Ventricular Rate 82 BPM    Atrial Rate 82 BPM    SC Interval 146 ms    QRS Duration 88 ms    QT Interval 424 ms    QTC Calculation(Bazett) 495 ms    P Axis 150 degrees    R Axis 26 degrees    T Axis 116 degrees    QRS Count 13 beats    Q Onset 218 ms    P Onset 145 ms    P Offset 194 ms    T Offset 430 ms    QTC Fredericia 470 ms   POCT GLUCOSE   Result Value Ref Range    POCT Glucose 97 74 - 99 mg/dL   Comprehensive metabolic panel   Result Value Ref Range    Glucose 97 74 - 99 mg/dL    Sodium 140 136 - 145 mmol/L    Potassium 3.6 3.5 - 5.3 mmol/L    Chloride 109 (H) 98 - 107 mmol/L    Bicarbonate 26 21 - 32 mmol/L    Anion Gap 9 (L) 10 - 20 mmol/L    Urea Nitrogen 15 6 - 23 mg/dL    Creatinine 1.07 (H) 0.50 - 1.05 mg/dL    eGFR 63 >60 mL/min/1.73m*2    Calcium 8.3 (L) 8.6 - 10.3 mg/dL    Albumin 3.6 3.4 - 5.0 g/dL    Alkaline Phosphatase 30 (L) 33 - 110 U/L    Total Protein 5.3 (L) 6.4 - 8.2 g/dL    AST 10 9 - 39 U/L    Bilirubin, Total 0.3 0.0 - 1.2 mg/dL    ALT 7 7 -  45 U/L   CBC and Auto Differential   Result Value Ref Range    WBC 6.9 4.4 - 11.3 x10*3/uL    nRBC 0.0 0.0 - 0.0 /100 WBCs    RBC 3.72 (L) 4.00 - 5.20 x10*6/uL    Hemoglobin 11.3 (L) 12.0 - 16.0 g/dL    Hematocrit 33.3 (L) 36.0 - 46.0 %    MCV 90 80 - 100 fL    MCH 30.4 26.0 - 34.0 pg    MCHC 33.9 32.0 - 36.0 g/dL    RDW 12.8 11.5 - 14.5 %    Platelets 261 150 - 450 x10*3/uL    Neutrophils % 42.3 40.0 - 80.0 %    Immature Granulocytes %, Automated 0.1 0.0 - 0.9 %    Lymphocytes % 50.5 13.0 - 44.0 %    Monocytes % 5.7 2.0 - 10.0 %    Eosinophils % 1.3 0.0 - 6.0 %    Basophils % 0.1 0.0 - 2.0 %    Neutrophils Absolute 2.91 1.20 - 7.70 x10*3/uL    Immature Granulocytes Absolute, Automated 0.01 0.00 - 0.70 x10*3/uL    Lymphocytes Absolute 3.48 1.20 - 4.80 x10*3/uL    Monocytes Absolute 0.39 0.10 - 1.00 x10*3/uL    Eosinophils Absolute 0.09 0.00 - 0.70 x10*3/uL    Basophils Absolute 0.01 0.00 - 0.10 x10*3/uL   Magnesium   Result Value Ref Range    Magnesium 1.86 1.60 - 2.40 mg/dL   Lipid Panel   Result Value Ref Range    Cholesterol 122 0 - 199 mg/dL    HDL-Cholesterol 55.2 mg/dL    Cholesterol/HDL Ratio 2.2     LDL Calculated 57 <=99 mg/dL    VLDL 10 0 - 40 mg/dL    Triglycerides 50 0 - 149 mg/dL    Non HDL Cholesterol 67 0 - 149 mg/dL   Hemoglobin A1C   Result Value Ref Range    Hemoglobin A1C 4.9 See comment %    Estimated Average Glucose 94 Not Established mg/dL   B-Type Natriuretic Peptide   Result Value Ref Range    BNP 15 0 - 99 pg/mL   SST TOP   Result Value Ref Range    Extra Tube Hold for add-ons.    TSH with reflex to Free T4 if abnormal   Result Value Ref Range    Thyroid Stimulating Hormone 1.73 0.44 - 3.98 mIU/L   Vitamin B12   Result Value Ref Range    Vitamin B12 387 211 - 911 pg/mL   POCT GLUCOSE   Result Value Ref Range    POCT Glucose 99 74 - 99 mg/dL   POCT GLUCOSE   Result Value Ref Range    POCT Glucose 95 74 - 99 mg/dL   Drug Screen, Urine   Result Value Ref Range    Amphetamine Screen, Urine  Presumptive Negative Presumptive Negative    Barbiturate Screen, Urine Presumptive Negative Presumptive Negative    Benzodiazepines Screen, Urine Presumptive Negative Presumptive Negative    Cannabinoid Screen, Urine Presumptive Positive (A) Presumptive Negative    Cocaine Metabolite Screen, Urine Presumptive Negative Presumptive Negative    Fentanyl Screen, Urine Presumptive Negative Presumptive Negative    Opiate Screen, Urine Presumptive Negative Presumptive Negative    Oxycodone Screen, Urine Presumptive Negative Presumptive Negative    PCP Screen, Urine Presumptive Negative Presumptive Negative    Methadone Screen, Urine Presumptive Negative Presumptive Negative   Urinalysis with Reflex Microscopic   Result Value Ref Range    Color, Urine Colorless (N) Light-Yellow, Yellow, Dark-Yellow    Appearance, Urine Clear Clear    Specific Gravity, Urine 1.008 1.005 - 1.035    pH, Urine 7.5 5.0, 5.5, 6.0, 6.5, 7.0, 7.5, 8.0    Protein, Urine NEGATIVE NEGATIVE, 10 (TRACE), 20 (TRACE) mg/dL    Glucose, Urine Normal Normal mg/dL    Blood, Urine NEGATIVE NEGATIVE mg/dL    Ketones, Urine NEGATIVE NEGATIVE mg/dL    Bilirubin, Urine NEGATIVE NEGATIVE mg/dL    Urobilinogen, Urine Normal Normal mg/dL    Nitrite, Urine NEGATIVE NEGATIVE    Leukocyte Esterase, Urine NEGATIVE NEGATIVE   Transthoracic Echo Complete   Result Value Ref Range    AV mn grad 3 mmHg    AV pk nick 1.19 m/s    LV Biplane EF 69 %    LVOT diam 2.10 cm    MV E/A ratio 1.35     Tricuspid annular plane systolic excursion 3.5 cm    LA vol index A/L 20.9 ml/m2    LV EF 68 %    RV free wall pk S' 13.40 cm/s    RVSP 26 mmHg    LVIDd 4.50 cm    Aortic Valve Area by Continuity of Peak Velocity 3.09 cm2    AV pk grad 6 mmHg    Aortic Valve Area by Continuity of VTI 3.19 cm2    LV A4C EF 71.0    POCT GLUCOSE   Result Value Ref Range    POCT Glucose 105 (H) 74 - 99 mg/dL   CBC and Auto Differential   Result Value Ref Range    WBC 6.1 4.4 - 11.3 x10*3/uL    nRBC 0.0 0.0 -  0.0 /100 WBCs    RBC 4.20 4.00 - 5.20 x10*6/uL    Hemoglobin 12.6 12.0 - 16.0 g/dL    Hematocrit 37.6 36.0 - 46.0 %    MCV 90 80 - 100 fL    MCH 30.0 26.0 - 34.0 pg    MCHC 33.5 32.0 - 36.0 g/dL    RDW 12.7 11.5 - 14.5 %    Platelets 281 150 - 450 x10*3/uL    Neutrophils % 43.6 40.0 - 80.0 %    Immature Granulocytes %, Automated 0.2 0.0 - 0.9 %    Lymphocytes % 48.9 13.0 - 44.0 %    Monocytes % 5.7 2.0 - 10.0 %    Eosinophils % 1.3 0.0 - 6.0 %    Basophils % 0.3 0.0 - 2.0 %    Neutrophils Absolute 2.67 1.20 - 7.70 x10*3/uL    Immature Granulocytes Absolute, Automated 0.01 0.00 - 0.70 x10*3/uL    Lymphocytes Absolute 3.00 1.20 - 4.80 x10*3/uL    Monocytes Absolute 0.35 0.10 - 1.00 x10*3/uL    Eosinophils Absolute 0.08 0.00 - 0.70 x10*3/uL    Basophils Absolute 0.02 0.00 - 0.10 x10*3/uL   Magnesium   Result Value Ref Range    Magnesium 1.75 1.60 - 2.40 mg/dL   SST TOP   Result Value Ref Range    Extra Tube Hold for add-ons.    Comprehensive metabolic panel   Result Value Ref Range    Glucose 94 74 - 99 mg/dL    Sodium 140 136 - 145 mmol/L    Potassium 3.6 3.5 - 5.3 mmol/L    Chloride 109 (H) 98 - 107 mmol/L    Bicarbonate 26 21 - 32 mmol/L    Anion Gap 9 (L) 10 - 20 mmol/L    Urea Nitrogen 9 6 - 23 mg/dL    Creatinine 0.73 0.50 - 1.05 mg/dL    eGFR >90 >60 mL/min/1.73m*2    Calcium 9.0 8.6 - 10.3 mg/dL    Albumin 3.9 3.4 - 5.0 g/dL    Alkaline Phosphatase 35 33 - 110 U/L    Total Protein 6.2 (L) 6.4 - 8.2 g/dL    AST 11 9 - 39 U/L    Bilirubin, Total 0.4 0.0 - 1.2 mg/dL    ALT 7 7 - 45 U/L   POCT GLUCOSE   Result Value Ref Range    POCT Glucose 94 74 - 99 mg/dL   EEG  Result Date: 7/25/2025  IMPRESSION This routine awake and drowsy EEG is normal. No epileptiform discharges, seizures or lateralizing signs are seen. This report has been interpreted and electronically signed by    MR brain wo IV contrast  Result Date: 7/24/2025  Interpreted By:  Trini Gilmore, STUDY: MR BRAIN WO IV CONTRAST;  7/24/2025 9:06 pm    INDICATION: Signs/Symptoms:stroke r/o.     COMPARISON: None.   ACCESSION NUMBER(S): [id number]   ORDERING CLINICIAN: IGNACIO PINA   TECHNIQUE: Axial T2, FLAIR, DWI, gradient echo T2 and sagittal and coronal T1 weighted images of brain were acquired.   FINDINGS: CSF Spaces: The ventricles, sulci and basal cisterns are within normal limits.   Parenchyma: There is no diffusion restriction abnormality to suggest acute infarct.  There is no focal parenchymal signal abnormality. There is no mass effect or midline shift.   Paranasal Sinuses and Mastoids: Visualized paranasal sinuses and mastoid air cells are unremarkable.       No evidence of acute infarct, intracranial mass effect or midline shift. Normal MRI of the brain   MACRO: None   Signed by: Trini Gilmore 7/24/2025 10:07 PM Dictation workstation:   [id number]    Transthoracic Echo Complete  Result Date: 7/24/2025          [place] [id number]  Tel [phone] Fax [phone] TRANSTHORACIC ECHOCARDIOGRAM REPORT Patient Name:       ASHLEY Crane Physician:    [id number] Dixon Cordero MD, [facility] Study Date:         7/24/2025           Ordering Provider:    [id number] IGNACIO PINA MRN/PID:            [id number]            Fellow: Accession#:         [id number]        Nurse:                Nick Green RN Date of Birth/Age:  1970 / 54 years Sonographer:          Rosangela MAR Gender Assigned at  F                   Additional Staff: Birth: Height:             172.72 cm           Admit Date:           7/23/2025 Weight:             73.48 kg            Admission Status:     Inpatient -                                                               Routine BSA / BMI:          1.87 m2 / 24.63     Department Location:  [facility]                      kg/m2                                     Echo Lab Blood Pressure: 100 /54 mmHg Study Type:    TRANSTHORACIC ECHO (TTE) COMPLETE Diagnosis/ICD: Cerebral Infarction, unspecified-I63.9 Indication:    Cerebrovascular Accident CPT Codes:     Echo Complete w Full Doppler-51620  Study Detail: The following Echo studies were performed: 2D, M-Mode, Doppler and               color flow. Agitated saline used as a contrast agent for               intraseptal flow evaluation. The patient was awake.  PHYSICIAN INTERPRETATION: Left Ventricle: The left ventricular systolic function is normal with a visually estimated ejection fraction of 65-70%. There are no regional wall motion abnormalities. The left ventricular cavity size is upper limits of normal. There is normal septal and mildly increased posterior left ventricular wall thickness. There is left ventricular concentric remodeling. Left ventricular diastolic filling was indeterminate. Borderline LVH. Left Atrium: The left atrial size is normal. There is no evidence of a patent foramen ovale. There is no shunt detected. There is no atrial septal defect present. A bubble study using agitated saline was performed. Bubble study is negative. Right Ventricle: The right ventricle is normal in size. There is normal right ventricular global systolic function. Right Atrium: The right atrial size is normal. Aortic Valve: The aortic valve is trileaflet. The aortic valve area by VTI is 3.19 cmï¿½ with a peak velocity of 1.19 m/s. The peak and mean gradients are 6 mmHg and 3 mmHg, respectively, with a dimensionless index of 0.92. There is no evidence of aortic valve regurgitation. Mitral Valve: The mitral valve is normal in structure. There is no evidence of mitral valve regurgitation. The E Vmax is 1.16 m/s. Tricuspid Valve: The tricuspid valve is structurally normal. There is mild tricuspid regurgitation. The Doppler estimated right ventricular systolic pressure (RVSP) is  within normal limits at 26 mmHg. Pulmonic Valve: The pulmonic valve is structurally normal. There is no indication of pulmonic valve regurgitation. Pericardium: Small pericardial effusion. The pericardial effusion appears to contain mixed echogenic material. There is no evidence of cardiac tamponade. Small pericardial effusion primarily around the RV. There is considerable visceral pericardial thickening around the RV. Some appears fibrinous. Aorta: The aortic root is normal.  CONCLUSIONS:  1. The left ventricular systolic function is normal with a visually estimated ejection fraction of 65-70%.  2. Left ventricular diastolic filling was indeterminate.  3. Borderline LVH.  4. There is normal right ventricular global systolic function.  5. Small pericardial effusion.  6. There is no evidence of cardiac tamponade.  7. Small pericardial effusion primarily around the RV. There is considerable visceral pericardial thickening around the RV. Some appears fibrinous.  8. The Doppler estimated RVSP is within normal limits at 26 mmHg.  9. There is no evidence of a patent foramen ovale. 10. Normal valve structure and function. 11. There is no shunt detected. QUANTITATIVE DATA SUMMARY:  2D MEASUREMENTS:             Normal Ranges: Ao Root d:       3.20 cm     (2.0-3.7cm) LAs:             3.20 cm     (2.7-4.0cm) IVSd:            0.90 cm     (0.6-1.1cm) LVPWd:           1.20 cm     (0.6-1.1cm) LVIDd:           4.50 cm     (3.9-5.9cm) LVIDs:           2.90 cm LV Mass Index:   87.7 g/m2 LVEDV Index:     30.31 ml/m2 LV % FS          35.6 %  LEFT ATRIUM:                  Normal Ranges: LA Vol A4C:        32.3 ml    (22+/-6mL/m2) LA Vol A2C:        45.8 ml LA Vol BP:         39.1 ml LA Vol Index A4C:  17.3ml/m2 LA Vol Index A2C:  24.5 ml/m2 LA Vol Index BP:   20.9 ml/m2 LA Area A4C:       14.6 cm2 LA Area A2C:       17.1 cm2 LA Major Axis A4C: 5.6 cm LA Major Axis A2C: 5.4 cm LA Volume Index:   19.9 ml/m2  RIGHT ATRIUM:                  Normal Ranges: RA Vol A4C:        42.4 ml    (8.3-19.5ml) RA Vol Index A4C:  22.7 ml/m2 RA Area A4C:       15.1 cm2 RA Major Axis A4C: 4.6 cm  AORTA MEASUREMENTS:         Normal Ranges: Asc Ao, d:          3.00 cm (2.1-3.4cm)  LV SYSTOLIC FUNCTION:                      Normal Ranges: EF-A4C View:    71 % (>=55%) EF-A2C View:    66 % EF-Biplane:     69 % EF-Visual:      68 % LV EF Reported: 68 %  LV DIASTOLIC FUNCTION:             Normal Ranges: MV Peak E:             1.16 m/s    (0.7-1.2 m/s) MV Peak A:             0.86 m/s    (0.42-0.7 m/s) E/A Ratio:             1.35        (1.0-2.2) MV e'                  0.094 m/s   (>8.0) MV lateral e'          0.11 m/s MV medial e'           0.07 m/s E/e' Ratio:            12.41       (<8.0) PulmV Sys Darryl:         61.70 cm/s PulmV [place] Darryl:        44.10 cm/s PulmV S/D Darryl:         1.40 PulmV A Revs Darryl:      39.80 cm/s PulmV A Revs Dur:      153.00 msec  MITRAL VALVE:          Normal Ranges: MV DT:        208 msec (150-240msec)  AORTIC VALVE:                     Normal Ranges: AoV Vmax:                1.19 m/s (<=1.7m/s) AoV Peak P.7 mmHg (<20mmHg) AoV Mean PG:             3.0 mmHg (1.7-11.5mmHg) LVOT Max Darryl:            1.06 m/s (<=1.1m/s) AoV VTI:                 27.00 cm (18-25cm) LVOT VTI:                24.90 cm LVOT Diameter:           2.10 cm  (1.8-2.4cm) AoV Area, VTI:           3.19 cm2 (2.5-5.5cm2) AoV Area,Vmax:           3.09 cm2 (2.5-4.5cm2) AoV Dimensionless Index: 0.92  RIGHT VENTRICLE: RV Basal 3.40 cm RV Mid   2.50 cm RV Major 6.3 cm TAPSE:   34.6 mm RV s'    0.13 m/s  TRICUSPID VALVE/RVSP:          Normal Ranges: Peak TR Velocity:     2.40 m/s Est. RA Pressure:     3 mmHg RV Syst Pressure:     26 mmHg  (< 30mmHg) IVC Diam:             1.60 cm  PULMONIC VALVE:          Normal Ranges: PV Accel Time:  180 msec (>120ms) PV Max Darryl:     0.8 m/s  (0.6-0.9m/s) PV Max P.4 mmHg  PULMONARY VEINS: PulmV A Revs Dur: 153.00 msec PulmV A Revs Darryl:  39.80 cm/s PulmV [place] Darryl:   44.10 cm/s PulmV S/D Darryl:    1.40 PulmV Sys Darryl:    61.70 cm/s  [id number] Dixon Cordero MD, [facility] Electronically signed on 7/24/2025 at 6:57:59 PM  ** Final **     ECG 12 lead  Result Date: 7/24/2025  Unusual P axis, possible ectopic atrial rhythm Low voltage QRS Nonspecific T wave abnormality Prolonged QT Abnormal ECG No previous ECGs available See ED provider note for full interpretation and clinical correlation    CT brain attack angio head and neck W and WO IV contrast  Result Date: 7/23/2025  Interpreted By:  Kevin Arciniega, STUDY: CT BRAIN ATTACK ANGIO HEAD AND NECK W AND WO IV CONTRAST;  7/23/2025 10:47 pm   INDICATION: Signs/Symptoms:stroke like symptoms.     COMPARISON: None.   ACCESSION NUMBER(S): [id number]   ORDERING CLINICIAN: SHELTON RAMSEY   TECHNIQUE: CT angiogram of head and neck was obtained after administration of intravenous contrast with coronal sagittal reformats. 3D volume rendering of vessels was obtained on a separate workstation.   FINDINGS: Anterior circulation: Bilateral common carotid and internal carotid arteries are widely patent without stenosis or dissection. Proximal anterior cerebral and middle cerebral arteries are widely patent without stenosis.   No evidence of intracranial aneurysm.   Posterior circulation: Bilateral vertebral arteries are widely patent without focal stenosis or dissection. Basilar artery and posterior cerebral arteries are widely patent without focal stenosis.     Multiple small bilateral thyroid nodules. Soft tissues of the neck are otherwise grossly normal. Endplate degenerative changes at C4 through C7 with mild to moderate bilateral foraminal stenosis at those levels. Lung apices are clear.       No evidence of large vessel stenosis in the neck or intracranially. Multiple small bilateral thyroid nodules. Correlation with thyroid function tests recommended.   MACRO: None   Signed by: Kevin Arciniega 7/23/2025 11:30 PM Dictation  workstation:   [id number]    CT brain attack head wo IV contrast  Result Date: 7/23/2025  Interpreted By:  Nicho Del Cid, STUDY: CT BRAIN ATTACK HEAD WO IV CONTRAST;  7/23/2025 10:27 pm   INDICATION: Signs/Symptoms:Stroke Evaluation.   COMPARISON: None   ACCESSION NUMBER(S): [id number]   ORDERING CLINICIAN: SHELTON RAMSEY   TECHNIQUE: Contiguous axial images of the head were obtained without intravenous contrast.   FINDINGS: BRAIN PARENCHYMA:   The gray white matter differentiation is preserved.  No mass effect or midline shift.   HEMORRHAGE:  No evidence of acute intracranial hemorrhage. VENTRICLES AND EXTRA-AXIAL SPACES:  The ventricles are within normal limits in size for brain volume.  No evidence of abnormal extraaxial fluid collection. EXTRACRANIAL SOFT TISSUES:  Within normal limits. PARANASAL SINUSES/MASTOIDS:  The visualized paranasal sinuses and mastoid air cells are clear and well pneumatized. CALVARIUM:  No evidence of depressed calvarial fracture.   OTHER FINDINGS:  None       No evidence of acute intracranial hemorrhage or mass effect.   If there is persistent concern for acute ischemia or intracranial process, MRI may be obtained for further evaluation as clinically indicated.   MACRO: Nicho Del Cid discussed the significance and urgency of this critical finding by telephone with SHELTON RAMSEY on 7/23/2025 at 10:39 pm. (**-RCF-**) Findings:  See findings.   Signed by: Nicho Del Cid 7/23/2025 10:40 PM Dictation workstation:   [id number]        NIH Stroke Scale  1A. Level of Consciousness: Alert, Keenly Responsive  1B. Ask Month and Age: Both Questions Right  1C. Blink Eyes & Squeeze Hands: Performs Both Tasks  2. Best Gaze: Normal  3. Visual: No Visual Loss  4. Facial Palsy: Normal Symmetrical Movements  5A. Motor - Left Arm: No Drift  5B. Motor - Right Arm: No Drift  6A. Motor - Left Leg: No Drift  6B. Motor - Right Leg: No Drift  7. Limb Ataxia: Absent  8. Sensory Loss: Normal  9. Best Language:  No Aphasia  10. Dysarthria: Normal  11. Extinction and Inattention: No Abnormality  NIH Stroke Scale: 0           [place] Coma Scale  Best Eye Response: Spontaneous  Best Verbal Response: Oriented  Best Motor Response: Follows commands  [place]aty Coma Scale Score: 15                                Assessment & Plan  Slurred speech    Spell of altered cognition  Impression:      Transient episode of decreased awareness, blurry vision and slurred speech, now fully back to baseline.  Her neuroexam is overall reassuring with no focal deficits noted.  Most likely presyncope/dysautonomia from known POTS or side effect from kratum.    Recommendations:  -B12 1,000mcg daily  - outpatient follow up of incidental thyroid nodules     Follow up with Dr. Rg for dysautonomia     No further needs from neurology; okay for transfer or discharge as per primary team. Please contact if condition changes for re-eval.        I spent 30 minutes in the professional and overall care of this patient.      Rosario Ho, DONNA-CNP

## 2025-07-25 NOTE — PROGRESS NOTES
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  - consult neuro  - MRI/MRA- negative  - ASA, Statin  - echo negative bubble study  - neuro checks q4hr and NIH as needed  - Send UA and drug screen = + cannabis, negative UA   - resume home meds once rec complete  - Recommend patient follow up with Rheumatologist for Hx of Asiya Sewell- she does not currently follow with provider     # Thyroid nodules on Scan  TSH pending  Recommend ultrasound thyroid outpatient       DVTp: SCDs  Full Code  Diet: Regular  Disposition: home 24 hours pending Neurology recommendations      DONNA Ruiz-CNP    SUBJECTIVE     No acute events overnight. Hemodynamically stable. Updated on negative MRI. EEG obtained this morning will be read by neurology, pending these results she will be discharged this afternoon versus one more day.     OBJECTIVE:     Last Recorded Vitals:  Vitals:    07/24/25 2008 07/25/25 0000 07/25/25 0400 07/25/25 0742   BP: 88/50 114/62 104/56 104/53   BP Location: Left arm   Right arm   Patient Position: Sitting   Lying   Pulse: 65 55 60 61   Resp: 16 15 15 18   Temp: 36.6 °C (97.9 °F) 37 °C (98.6 °F) 36 °C (96.8 °F) 36.2 °C (97.2 °F)   TempSrc: Temporal Temporal Temporal Temporal   SpO2: 98% 97% 97% 97%   Weight:       Height:         Last I/O:  No intake/output data recorded.    Physical Exam  Constitutional:       General: She is not in acute distress.     Appearance: She is not ill-appearing.   HENT:       Head: Normocephalic.      Mouth/Throat:      Mouth: Mucous membranes are moist.     Eyes:      Pupils: Pupils are equal, round, and reactive to light.       Cardiovascular:      Rate and Rhythm: Normal rate.      Pulses: Normal pulses.   Pulmonary:      Effort: Pulmonary effort is normal.      Breath sounds: Normal breath sounds.   Abdominal:      General: Abdomen is flat.      Palpations: Abdomen is soft.     Musculoskeletal:         General: Normal range of motion.     Skin:     General: Skin is warm and dry.      Capillary Refill: Capillary refill takes less than 2 seconds.     Neurological:      General: No focal deficit present.      Mental Status: She is alert and oriented to person, place, and time.           Inpatient Medications:  Scheduled Medications[1]    PRN Medications  PRN Medications[2]    Continuous Medications:  Continuous Medications[3]  LABS AND IMAGING:     Labs:  Results from last 7 days   Lab Units 07/25/25  0403 07/24/25  0407 07/23/25  2230   WBC AUTO x10*3/uL 6.1 6.9 8.0   RBC AUTO x10*6/uL 4.20 3.72* 4.31   HEMOGLOBIN g/dL 12.6 11.3* 13.0   HEMATOCRIT % 37.6 33.3* 39.3   MCV fL 90 90 91   MCH pg 30.0 30.4 30.2   MCHC g/dL 33.5 33.9 33.1   RDW % 12.7 12.8 12.6   PLATELETS AUTO x10*3/uL 281 261 280     Results from last 7 days   Lab Units 07/25/25  0404 07/24/25  0407 07/23/25  2230   SODIUM mmol/L 140 140 137   POTASSIUM mmol/L 3.6 3.6 3.5   CHLORIDE mmol/L 109* 109* 105   CO2 mmol/L 26 26 26   BUN mg/dL 9 15 15   CREATININE mg/dL 0.73 1.07* 1.06*   GLUCOSE mg/dL 94 97 92   PROTEIN TOTAL g/dL 6.2* 5.3* 6.8   CALCIUM mg/dL 9.0 8.3* 9.1   BILIRUBIN TOTAL mg/dL 0.4 0.3 0.4   ALK PHOS U/L 35 30* 38   AST U/L 11 10 13   ALT U/L 7 7 8     Results from last 7 days   Lab Units 07/25/25  0403 07/24/25  0407   MAGNESIUM mg/dL 1.75 1.86     Results from last 7 days   Lab Units 07/23/25  2230   TROPHS ng/L <3     Imaging:  MR brain wo IV contrast  Narrative: Interpreted By:  Trini Gilmore,   STUDY:  MR  BRAIN WO IV CONTRAST;  7/24/2025 9:06 pm      INDICATION:  Signs/Symptoms:stroke r/o.          COMPARISON:  None.      ACCESSION NUMBER(S):  [id number]      ORDERING CLINICIAN:  IGNACIO PINA      TECHNIQUE:  Axial T2, FLAIR, DWI, gradient echo T2 and sagittal and coronal T1  weighted images of brain were acquired.      FINDINGS:  CSF Spaces: The ventricles, sulci and basal cisterns are within  normal limits.      Parenchyma: There is no diffusion restriction abnormality to suggest  acute infarct.  There is no focal parenchymal signal abnormality.  There is no mass effect or midline shift.      Paranasal Sinuses and Mastoids: Visualized paranasal sinuses and  mastoid air cells are unremarkable.      Impression: No evidence of acute infarct, intracranial mass effect or midline  shift. Normal MRI of the brain      MACRO:  None      Signed by: Trini Gilmore 7/24/2025 10:07 PM  Dictation workstation:   [id number]  Transthoracic Echo [place] [id number]   Tel [phone] Fax [phone]    TRANSTHORACIC ECHOCARDIOGRAM REPORT    Patient Name:       ASHLEY Crane Physician:    [id number] Dixon Cordero MD, [facility]  Study Date:         7/24/2025           Ordering Provider:    [id number] IGNACIO PINA  MRN/PID:            [id number]            Fellow:  Accession#:         [id number]        Nurse:                Nick Green RN  Date of Birth/Age:  1970 / 54 years Sonographer:          Rosangela MAR  Gender Assigned at  F                   Additional Staff:  Birth:  Height:             172.72 cm           Admit Date:           7/23/2025  Weight:             73.48 kg            Admission Status:     Inpatient -                                                                 Routine  BSA / BMI:          1.87 m2 / 24.63     Department Location:  [facility]                      kg/m2                                     Echo Lab  Blood Pressure: 100 /54 mmHg    Study Type:    TRANSTHORACIC ECHO (TTE) COMPLETE  Diagnosis/ICD: Cerebral Infarction, unspecified-I63.9  Indication:    Cerebrovascular Accident  CPT Codes:     Echo Complete w Full Doppler-[id number]   Study Detail: The following Echo studies were performed: 2D, M-Mode, Doppler and                color flow. Agitated saline used as a contrast agent for                intraseptal flow evaluation. The patient was awake.       PHYSICIAN INTERPRETATION:  Left Ventricle: The left ventricular systolic function is normal with a visually estimated ejection fraction of 65-70%. There are no regional wall motion abnormalities. The left ventricular cavity size is upper limits of normal. There is normal septal and mildly increased posterior left ventricular wall thickness. There is left ventricular concentric remodeling. Left ventricular diastolic filling was indeterminate. Borderline LVH.  Left Atrium: The left atrial size is normal. There is no evidence of a patent foramen ovale. There is no shunt detected. There is no atrial septal defect present. A bubble study using agitated saline was performed. Bubble study is negative.  Right Ventricle: The right ventricle is normal in size. There is normal right ventricular global systolic function.  Right Atrium: The right atrial size is normal.  Aortic Valve: The aortic valve is trileaflet. The aortic valve area by VTI is 3.19 cmï¿½ with a peak velocity of 1.19 m/s. The peak and mean gradients are 6 mmHg and 3 mmHg, respectively, with a dimensionless index of 0.92. There is no evidence of aortic valve regurgitation.  Mitral Valve: The mitral valve is normal in structure. There is no evidence of mitral valve regurgitation. The E Vmax is 1.16 m/s.  Tricuspid Valve: The tricuspid valve is  structurally normal. There is mild tricuspid regurgitation. The Doppler estimated right ventricular systolic pressure (RVSP) is within normal limits at 26 mmHg.  Pulmonic Valve: The pulmonic valve is structurally normal. There is no indication of pulmonic valve regurgitation.  Pericardium: Small pericardial effusion. The pericardial effusion appears to contain mixed echogenic material. There is no evidence of cardiac tamponade. Small pericardial effusion primarily around the RV. There is considerable visceral pericardial thickening around the RV. Some appears fibrinous.  Aorta: The aortic root is normal.       CONCLUSIONS:   1. The left ventricular systolic function is normal with a visually estimated ejection fraction of 65-70%.   2. Left ventricular diastolic filling was indeterminate.   3. Borderline LVH.   4. There is normal right ventricular global systolic function.   5. Small pericardial effusion.   6. There is no evidence of cardiac tamponade.   7. Small pericardial effusion primarily around the RV. There is considerable visceral pericardial thickening around the RV. Some appears fibrinous.   8. The Doppler estimated RVSP is within normal limits at 26 mmHg.   9. There is no evidence of a patent foramen ovale.  10. Normal valve structure and function.  11. There is no shunt detected.    QUANTITATIVE DATA SUMMARY:     2D MEASUREMENTS:             Normal Ranges:  Ao Root d:       3.20 cm     (2.0-3.7cm)  LAs:             3.20 cm     (2.7-4.0cm)  IVSd:            0.90 cm     (0.6-1.1cm)  LVPWd:           1.20 cm     (0.6-1.1cm)  LVIDd:           4.50 cm     (3.9-5.9cm)  LVIDs:           2.90 cm  LV Mass Index:   87.7 g/m2  LVEDV Index:     30.31 ml/m2  LV % FS          35.6 %       LEFT ATRIUM:                  Normal Ranges:  LA Vol A4C:        32.3 ml    (22+/-6mL/m2)  LA Vol A2C:        45.8 ml  LA Vol BP:         39.1 ml  LA Vol Index A4C:  17.3ml/m2  LA Vol Index A2C:  24.5 ml/m2  LA Vol Index BP:   20.9  ml/m2  LA Area A4C:       14.6 cm2  LA Area A2C:       17.1 cm2  LA Major Axis A4C: 5.6 cm  LA Major Axis A2C: 5.4 cm  LA Volume Index:   19.9 ml/m2       RIGHT ATRIUM:                 Normal Ranges:  RA Vol A4C:        42.4 ml    (8.3-19.5ml)  RA Vol Index A4C:  22.7 ml/m2  RA Area A4C:       15.1 cm2  RA Major Axis A4C: 4.6 cm       AORTA MEASUREMENTS:         Normal Ranges:  Asc Ao, d:          3.00 cm (2.1-3.4cm)       LV SYSTOLIC FUNCTION:                       Normal Ranges:  EF-A4C View:    71 % (>=55%)  EF-A2C View:    66 %  EF-Biplane:     69 %  EF-Visual:      68 %  LV EF Reported: 68 %       LV DIASTOLIC FUNCTION:             Normal Ranges:  MV Peak E:             1.16 m/s    (0.7-1.2 m/s)  MV Peak A:             0.86 m/s    (0.42-0.7 m/s)  E/A Ratio:             1.35        (1.0-2.2)  MV e'                  0.094 m/s   (>8.0)  MV lateral e'          0.11 m/s  MV medial e'           0.07 m/s  E/e' Ratio:            12.41       (<8.0)  PulmV Sys Darryl:         61.70 cm/s  PulmV [place] Darryl:        44.10 cm/s  PulmV S/D Darryl:         1.40  PulmV A Revs Darryl:      39.80 cm/s  PulmV A Revs Dur:      153.00 msec       MITRAL VALVE:          Normal Ranges:  MV DT:        208 msec (150-240msec)       AORTIC VALVE:                     Normal Ranges:  AoV Vmax:                1.19 m/s (<=1.7m/s)  AoV Peak P.7 mmHg (<20mmHg)  AoV Mean PG:             3.0 mmHg (1.7-11.5mmHg)  LVOT Max Darryl:            1.06 m/s (<=1.1m/s)  AoV VTI:                 27.00 cm (18-25cm)  LVOT VTI:                24.90 cm  LVOT Diameter:           2.10 cm  (1.8-2.4cm)  AoV Area, VTI:           3.19 cm2 (2.5-5.5cm2)  AoV Area,Vmax:           3.09 cm2 (2.5-4.5cm2)  AoV Dimensionless Index: 0.92       RIGHT VENTRICLE:  RV Basal 3.40 cm  RV Mid   2.50 cm  RV Major 6.3 cm  TAPSE:   34.6 mm  RV s'    0.13 m/s       TRICUSPID VALVE/RVSP:          Normal Ranges:  Peak TR Velocity:     2.40 m/s  Est. RA Pressure:     3 mmHg  RV Syst  Pressure:     26 mmHg  (< 30mmHg)  IVC Diam:             1.60 cm       PULMONIC VALVE:          Normal Ranges:  PV Accel Time:  180 msec (>120ms)  PV Max Darryl:     0.8 m/s  (0.6-0.9m/s)  PV Max P.4 mmHg       PULMONARY VEINS:  PulmV A Revs Dur: 153.00 msec  PulmV A Revs Darryl: 39.80 cm/s  PulmV [place] Darryl:   44.10 cm/s  PulmV S/D Darryl:    1.40  PulmV Sys Darryl:    61.70 cm/s       [id number] Dixon Cordero MD, [facility]  Electronically signed on 2025 at 6:57:59 PM       ** Final **  ECG 12 lead  Unusual P axis, possible ectopic atrial rhythm  Low voltage QRS  Nonspecific T wave abnormality  Prolonged QT  Abnormal ECG  No previous ECGs available  See ED provider note for full interpretation and clinical correlation             [1] aspirin, 81 mg, oral, Daily   Or  aspirin, 81 mg, oral, Daily   Or  aspirin, 81 mg, nasogastric tube, Daily   Or  aspirin, 300 mg, rectal, Daily  atorvastatin, 40 mg, oral, Nightly  docusate sodium, 100 mg, oral, BID  insulin lispro, 0-5 Units, subcutaneous, TID AC  perflutren lipid microspheres, 0.5-10 mL of dilution, intravenous, Once in imaging  perflutren protein A microsphere, 0.5 mL, intravenous, Once in imaging  sulfur hexafluoride microsphr, 2 mL, intravenous, Once in imaging  [2] PRN medications: acetaminophen **OR** acetaminophen **OR** acetaminophen, dextrose, dextrose, glucagon, glucagon, labetaloL, ondansetron **OR** ondansetron, oxygen  [3]

## 2025-07-25 NOTE — CARE PLAN
The patient's goals for the shift include sleep    The clinical goals for the shift include patient will be up in a chair for meals      Problem: General Stroke  Goal: No symptoms of aspiration throughout shift  Outcome: Progressing     Problem: Chronic Conditions and Co-morbidities  Goal: Patient's chronic conditions and co-morbidity symptoms are monitored and maintained or improved  Outcome: Progressing     Problem: Fall/Injury  Goal: Not fall by end of shift  Outcome: Progressing     Problem: Fall/Injury  Goal: Be free from injury by end of the shift  Outcome: Progressing

## 2025-07-25 NOTE — CARE PLAN
The patient's goals for the shift include sleep    The clinical goals for the shift include monitor for neuro changes

## 2025-07-28 ENCOUNTER — APPOINTMENT (OUTPATIENT)
Dept: RADIOLOGY | Facility: HOSPITAL | Age: 55
End: 2025-07-28
Payer: MEDICARE

## 2025-07-28 ENCOUNTER — HOSPITAL ENCOUNTER (EMERGENCY)
Facility: HOSPITAL | Age: 55
Discharge: HOME | End: 2025-07-28
Payer: MEDICARE

## 2025-07-28 VITALS
HEIGHT: 67 IN | SYSTOLIC BLOOD PRESSURE: 126 MMHG | BODY MASS INDEX: 25.43 KG/M2 | OXYGEN SATURATION: 97 % | RESPIRATION RATE: 18 BRPM | TEMPERATURE: 97.9 F | WEIGHT: 162 LBS | HEART RATE: 78 BPM | DIASTOLIC BLOOD PRESSURE: 74 MMHG

## 2025-07-28 DIAGNOSIS — S61.219A FINGER LACERATION, INITIAL ENCOUNTER: Primary | ICD-10-CM

## 2025-07-28 LAB — HOLD SPECIMEN: NORMAL

## 2025-07-28 PROCEDURE — 2500000001 HC RX 250 WO HCPCS SELF ADMINISTERED DRUGS (ALT 637 FOR MEDICARE OP): Performed by: PHYSICIAN ASSISTANT

## 2025-07-28 PROCEDURE — 90715 TDAP VACCINE 7 YRS/> IM: CPT | Performed by: PHYSICIAN ASSISTANT

## 2025-07-28 PROCEDURE — 73140 X-RAY EXAM OF FINGER(S): CPT | Mod: LT

## 2025-07-28 PROCEDURE — 90471 IMMUNIZATION ADMIN: CPT | Performed by: PHYSICIAN ASSISTANT

## 2025-07-28 PROCEDURE — 2500000004 HC RX 250 GENERAL PHARMACY W/ HCPCS (ALT 636 FOR OP/ED): Performed by: PHYSICIAN ASSISTANT

## 2025-07-28 PROCEDURE — 73140 X-RAY EXAM OF FINGER(S): CPT | Mod: LEFT SIDE | Performed by: RADIOLOGY

## 2025-07-28 PROCEDURE — 12001 RPR S/N/AX/GEN/TRNK 2.5CM/<: CPT | Mod: LT | Performed by: PHYSICIAN ASSISTANT

## 2025-07-28 PROCEDURE — 99283 EMERGENCY DEPT VISIT LOW MDM: CPT

## 2025-07-28 RX ORDER — HYDROCODONE BITARTRATE AND ACETAMINOPHEN 5; 325 MG/1; MG/1
1 TABLET ORAL EVERY 6 HOURS PRN
Qty: 6 TABLET | Refills: 0 | Status: SHIPPED | OUTPATIENT
Start: 2025-07-28

## 2025-07-28 RX ORDER — LIDOCAINE HYDROCHLORIDE 10 MG/ML
5 INJECTION, SOLUTION INFILTRATION; PERINEURAL ONCE
Status: COMPLETED | OUTPATIENT
Start: 2025-07-28 | End: 2025-07-28

## 2025-07-28 RX ORDER — HYDROCODONE BITARTRATE AND ACETAMINOPHEN 5; 325 MG/1; MG/1
1 TABLET ORAL ONCE
Refills: 0 | Status: COMPLETED | OUTPATIENT
Start: 2025-07-28 | End: 2025-07-28

## 2025-07-28 RX ORDER — CEPHALEXIN 500 MG/1
500 TABLET, FILM COATED ORAL 4 TIMES DAILY
Qty: 20 TABLET | Refills: 0 | Status: SHIPPED | OUTPATIENT
Start: 2025-07-28 | End: 2025-08-02

## 2025-07-28 RX ADMIN — LIDOCAINE HYDROCHLORIDE 5 ML: 10 INJECTION, SOLUTION INFILTRATION; PERINEURAL at 18:32

## 2025-07-28 RX ADMIN — HYDROCODONE BITARTRATE AND ACETAMINOPHEN 1 TABLET: 5; 325 TABLET ORAL at 18:34

## 2025-07-28 RX ADMIN — TETANUS TOXOID, REDUCED DIPHTHERIA TOXOID AND ACELLULAR PERTUSSIS VACCINE, ADSORBED 0.5 ML: 5; 2.5; 8; 8; 2.5 SUSPENSION INTRAMUSCULAR at 18:34

## 2025-07-28 ASSESSMENT — LIFESTYLE VARIABLES
HAVE YOU EVER FELT YOU SHOULD CUT DOWN ON YOUR DRINKING: NO
HAVE PEOPLE ANNOYED YOU BY CRITICIZING YOUR DRINKING: NO
TOTAL SCORE: 0
EVER FELT BAD OR GUILTY ABOUT YOUR DRINKING: NO
EVER HAD A DRINK FIRST THING IN THE MORNING TO STEADY YOUR NERVES TO GET RID OF A HANGOVER: NO

## 2025-07-28 NOTE — ED PROVIDER NOTES
HPI   Chief Complaint   Patient presents with    Laceration       54-year-old female presents to the emergency department for a finger laceration.  She states she was gardening and using a saw and it saw through her glove and into her finger.  She complains of a bleeding laceration to the index finger.  Denies sensorimotor deficits.  No anticoagulants.              Patient History   Medical History[1]  Surgical History[2]  Family History[3]  Social History[4]    Physical Exam   ED Triage Vitals [07/28/25 1629]   Temperature Heart Rate Respirations BP   36.6 °C (97.9 °F) 78 18 126/74      Pulse Ox Temp Source Heart Rate Source Patient Position   97 % Temporal Monitor Sitting      BP Location FiO2 (%)     Right arm --       Physical Exam  Vitals and nursing note reviewed.   Constitutional:       General: She is not in acute distress.  HENT:      Head: Atraumatic.      Mouth/Throat:      Mouth: Mucous membranes are moist.      Pharynx: Oropharynx is clear.     Eyes:      Extraocular Movements: Extraocular movements intact.      Conjunctiva/sclera: Conjunctivae normal.      Pupils: Pupils are equal, round, and reactive to light.       Cardiovascular:      Rate and Rhythm: Normal rate and regular rhythm.      Pulses: Normal pulses.   Pulmonary:      Effort: Pulmonary effort is normal. No respiratory distress.      Breath sounds: Normal breath sounds.   Abdominal:      General: There is no distension.      Palpations: Abdomen is soft.      Tenderness: There is no abdominal tenderness. There is no guarding or rebound.     Musculoskeletal:         General: No deformity.      Cervical back: Neck supple.      Comments: Full range of motion of the left index finger is intact with no apparent tendon injury.  Sensation intact.  Capillary refill less than 2 seconds.     Skin:     General: Skin is warm and dry.      Comments: 1.5 cm flap laceration with torn and macerated tissue to the dorsal left index finger at the base of the  nail involving the matrix.  Moderate active bleeding.     Neurological:      Mental Status: She is alert and oriented to person, place, and time. Mental status is at baseline.      Cranial Nerves: No cranial nerve deficit.      Sensory: No sensory deficit.      Motor: No weakness.     Psychiatric:         Mood and Affect: Mood normal.         Behavior: Behavior normal.           ED Course & MDM   Diagnoses as of 07/28/25 1918   Finger laceration, initial encounter                 No data recorded                                 Medical Decision Making  54-year-old female presents emergency department for a laceration of her left index finger from a saw.  On my exam, she is neurovascularly intact in the finger and there is no apparent tendon injury.  She has a 1.5 cm flap laceration with torn tissue on the dorsal finger at the base of the nail, including the matrix.  I anesthetized, cleaned and approximated the wound myself.  Patient was treated here with Norco and IM Boostrix  I prescribed the patient Norco and Keflex.  I discussed with the patient the importance of following up for a wound check in 2-3 days. I instructed the patient to keep the wound clean and dry. Patient instructed to not get the wound wet for 24 hours and not to soak it until sutures are removed. Signs of infection were discussed at length.   Discussed results with patient and/or family/friend and recommended close follow up with primary care or specialist.  Reviewed return precautions at length.  I answered all questions.           Procedure  Procedures       [1]   Past Medical History:  Diagnosis Date    Personal history of other diseases of the female genital tract     History of ovarian cyst    Personal history of other diseases of the musculoskeletal system and connective tissue     History of joint pain    Sleep deprivation     Sleep deficient   [2]   Past Surgical History:  Procedure Laterality Date    OTHER SURGICAL HISTORY  08/14/2020     Jaw surgery    OTHER SURGICAL HISTORY  08/14/2020    Dilation and curettage    OTHER SURGICAL HISTORY  08/14/2020    Nasal septoplasty    OTHER SURGICAL HISTORY  08/14/2020    Gallbladder surgery    OTHER SURGICAL HISTORY  08/14/2020    Varicose vein ligation   [3] No family history on file.  [4]   Social History  Tobacco Use    Smoking status: Never     Passive exposure: Never    Smokeless tobacco: Never   Vaping Use    Vaping status: Never Used   Substance Use Topics    Alcohol use: Yes     Alcohol/week: 2.0 - 4.0 standard drinks of alcohol     Types: 2 - 4 Standard drinks or equivalent per week    Drug use: Yes     Frequency: 7.0 times per week     Types: Marijuana, Other     Comment: uses in cooking        Samreen Martinez PA-C  07/28/25 1922

## 2025-07-28 NOTE — ED PROCEDURE NOTE
Procedure  Laceration Repair    Performed by: Samreen Martinez PA-C  Authorized by: Samreen Martinez PA-C    Consent:     Consent obtained:  Verbal    Consent given by:  Patient    Risks, benefits, and alternatives were discussed: yes      Risks discussed:  Infection, need for additional repair, nerve damage, poor wound healing, poor cosmetic result, pain, retained foreign body, vascular damage and tendon damage    Alternatives discussed:  No treatment  Universal protocol:     Procedure explained and questions answered to patient or proxy's satisfaction: yes      Imaging studies available: yes      Patient identity confirmed:  Verbally with patient  Anesthesia:     Anesthesia method:  Nerve block    Block location:  Digital    Block anesthetic:  Lidocaine 1% w/o epi    Block technique:  Wing    Block injection procedure:  Anatomic landmarks identified, anatomic landmarks palpated, negative aspiration for blood, introduced needle and incremental injection    Block outcome:  Anesthesia achieved  Laceration details:     Location:  Finger    Finger location:  L index finger    Length (cm):  1.5  Pre-procedure details:     Preparation:  Patient was prepped and draped in usual sterile fashion  Exploration:     Imaging obtained: x-ray    Treatment:     Area cleansed with:  Chlorhexidine    Amount of cleaning:  Standard    Visualized foreign bodies/material removed: no      Debridement:  None  Skin repair:     Repair method:  Sutures    Suture size:  4-0    Suture material:  Prolene    Suture technique:  Simple interrupted    Number of sutures:  2  Approximation:     Approximation:  Close  Repair type:     Repair type:  Simple  Post-procedure details:     Dressing:  Non-adherent dressing    Procedure completion:  Tolerated               Samreen Martinez PA-C  07/28/25 1925

## 2025-07-31 LAB — METHYLMALONATE SERPL-SCNC: 0.2 UMOL/L (ref 0–0.4)

## 2025-08-04 ENCOUNTER — APPOINTMENT (OUTPATIENT)
Dept: RADIOLOGY | Facility: HOSPITAL | Age: 55
End: 2025-08-04
Payer: MEDICARE

## 2025-08-04 DIAGNOSIS — E04.2 MULTIPLE THYROID NODULES: ICD-10-CM

## 2025-08-04 LAB
ATRIAL RATE: 82 BPM
P AXIS: 150 DEGREES
P OFFSET: 194 MS
P ONSET: 145 MS
PR INTERVAL: 146 MS
Q ONSET: 218 MS
QRS COUNT: 13 BEATS
QRS DURATION: 88 MS
QT INTERVAL: 424 MS
QTC CALCULATION(BAZETT): 495 MS
QTC FREDERICIA: 470 MS
R AXIS: 26 DEGREES
T AXIS: 116 DEGREES
T OFFSET: 430 MS
VENTRICULAR RATE: 82 BPM

## 2025-08-04 PROCEDURE — 76536 US EXAM OF HEAD AND NECK: CPT | Performed by: STUDENT IN AN ORGANIZED HEALTH CARE EDUCATION/TRAINING PROGRAM

## 2025-08-04 PROCEDURE — 76536 US EXAM OF HEAD AND NECK: CPT

## 2025-08-15 LAB — GLUCOSE BLD MANUAL STRIP-MCNC: 90 MG/DL (ref 74–99)
